# Patient Record
Sex: MALE | Race: WHITE | NOT HISPANIC OR LATINO | Employment: OTHER
[De-identification: names, ages, dates, MRNs, and addresses within clinical notes are randomized per-mention and may not be internally consistent; named-entity substitution may affect disease eponyms.]

---

## 2019-01-08 ENCOUNTER — RECORDS - HEALTHEAST (OUTPATIENT)
Dept: ADMINISTRATIVE | Facility: OTHER | Age: 64
End: 2019-01-08

## 2021-05-28 ENCOUNTER — RECORDS - HEALTHEAST (OUTPATIENT)
Dept: ADMINISTRATIVE | Facility: CLINIC | Age: 66
End: 2021-05-28

## 2021-06-16 PROBLEM — I26.99 PULMONARY EMBOLISM ON LEFT (H): Status: ACTIVE | Noted: 2019-01-08

## 2021-06-16 PROBLEM — I26.99 ACUTE PULMONARY EMBOLISM (H): Status: ACTIVE | Noted: 2019-01-08

## 2021-06-26 ENCOUNTER — HEALTH MAINTENANCE LETTER (OUTPATIENT)
Age: 66
End: 2021-06-26

## 2021-10-16 ENCOUNTER — HEALTH MAINTENANCE LETTER (OUTPATIENT)
Age: 66
End: 2021-10-16

## 2022-07-17 ENCOUNTER — HEALTH MAINTENANCE LETTER (OUTPATIENT)
Age: 67
End: 2022-07-17

## 2022-09-25 ENCOUNTER — HEALTH MAINTENANCE LETTER (OUTPATIENT)
Age: 67
End: 2022-09-25

## 2023-08-06 ENCOUNTER — HEALTH MAINTENANCE LETTER (OUTPATIENT)
Age: 68
End: 2023-08-06

## 2023-09-24 ENCOUNTER — HOSPITAL ENCOUNTER (INPATIENT)
Facility: HOSPITAL | Age: 68
LOS: 3 days | Discharge: HOME | DRG: 418 | End: 2023-09-27
Attending: EMERGENCY MEDICINE | Admitting: INTERNAL MEDICINE
Payer: COMMERCIAL

## 2023-09-24 ENCOUNTER — APPOINTMENT (OUTPATIENT)
Dept: ULTRASOUND IMAGING | Facility: HOSPITAL | Age: 68
DRG: 418 | End: 2023-09-24
Attending: EMERGENCY MEDICINE
Payer: COMMERCIAL

## 2023-09-24 ENCOUNTER — APPOINTMENT (OUTPATIENT)
Dept: RADIOLOGY | Facility: HOSPITAL | Age: 68
DRG: 418 | End: 2023-09-24
Attending: EMERGENCY MEDICINE
Payer: COMMERCIAL

## 2023-09-24 DIAGNOSIS — I26.99 PULMONARY EMBOLISM ON LEFT (H): ICD-10-CM

## 2023-09-24 DIAGNOSIS — K80.50 CHOLEDOCHOLITHIASIS: Primary | ICD-10-CM

## 2023-09-24 DIAGNOSIS — I10 BENIGN ESSENTIAL HYPERTENSION: ICD-10-CM

## 2023-09-24 DIAGNOSIS — K81.0 ACUTE CHOLECYSTITIS: ICD-10-CM

## 2023-09-24 LAB
ALBUMIN SERPL BCG-MCNC: 4.1 G/DL (ref 3.5–5.2)
ALP SERPL-CCNC: 176 U/L (ref 40–129)
ALT SERPL W P-5'-P-CCNC: 37 U/L (ref 0–70)
ANION GAP SERPL CALCULATED.3IONS-SCNC: 14 MMOL/L (ref 7–15)
AST SERPL W P-5'-P-CCNC: 47 U/L (ref 0–45)
BASOPHILS # BLD AUTO: 0 10E3/UL (ref 0–0.2)
BASOPHILS NFR BLD AUTO: 0 %
BILIRUB DIRECT SERPL-MCNC: 0.56 MG/DL (ref 0–0.3)
BILIRUB SERPL-MCNC: 1 MG/DL
BUN SERPL-MCNC: 18.8 MG/DL (ref 8–23)
CALCIUM SERPL-MCNC: 9.2 MG/DL (ref 8.8–10.2)
CHLORIDE SERPL-SCNC: 98 MMOL/L (ref 98–107)
CREAT SERPL-MCNC: 0.92 MG/DL (ref 0.67–1.17)
DEPRECATED HCO3 PLAS-SCNC: 24 MMOL/L (ref 22–29)
EGFRCR SERPLBLD CKD-EPI 2021: >90 ML/MIN/1.73M2
EOSINOPHIL # BLD AUTO: 0.3 10E3/UL (ref 0–0.7)
EOSINOPHIL NFR BLD AUTO: 2 %
ERYTHROCYTE [DISTWIDTH] IN BLOOD BY AUTOMATED COUNT: 13.8 % (ref 10–15)
GLUCOSE SERPL-MCNC: 129 MG/DL (ref 70–99)
HCT VFR BLD AUTO: 44.4 % (ref 40–53)
HGB BLD-MCNC: 14.8 G/DL (ref 13.3–17.7)
HOLD SPECIMEN: NORMAL
IMM GRANULOCYTES # BLD: 0.1 10E3/UL
IMM GRANULOCYTES NFR BLD: 0 %
LACTATE SERPL-SCNC: 0.8 MMOL/L (ref 0.7–2)
LIPASE SERPL-CCNC: 43 U/L (ref 13–60)
LYMPHOCYTES # BLD AUTO: 2 10E3/UL (ref 0.8–5.3)
LYMPHOCYTES NFR BLD AUTO: 13 %
MAGNESIUM SERPL-MCNC: 1.6 MG/DL (ref 1.7–2.3)
MCH RBC QN AUTO: 28 PG (ref 26.5–33)
MCHC RBC AUTO-ENTMCNC: 33.3 G/DL (ref 31.5–36.5)
MCV RBC AUTO: 84 FL (ref 78–100)
MONOCYTES # BLD AUTO: 1.9 10E3/UL (ref 0–1.3)
MONOCYTES NFR BLD AUTO: 12 %
NEUTROPHILS # BLD AUTO: 11.5 10E3/UL (ref 1.6–8.3)
NEUTROPHILS NFR BLD AUTO: 73 %
NRBC # BLD AUTO: 0 10E3/UL
NRBC BLD AUTO-RTO: 0 /100
PLATELET # BLD AUTO: 270 10E3/UL (ref 150–450)
POTASSIUM SERPL-SCNC: 3.3 MMOL/L (ref 3.4–5.3)
PROT SERPL-MCNC: 7.3 G/DL (ref 6.4–8.3)
RBC # BLD AUTO: 5.28 10E6/UL (ref 4.4–5.9)
SODIUM SERPL-SCNC: 136 MMOL/L (ref 136–145)
TROPONIN T SERPL HS-MCNC: 12 NG/L
WBC # BLD AUTO: 15.8 10E3/UL (ref 4–11)

## 2023-09-24 PROCEDURE — 82248 BILIRUBIN DIRECT: CPT | Performed by: EMERGENCY MEDICINE

## 2023-09-24 PROCEDURE — 83605 ASSAY OF LACTIC ACID: CPT | Performed by: EMERGENCY MEDICINE

## 2023-09-24 PROCEDURE — 258N000003 HC RX IP 258 OP 636: Performed by: EMERGENCY MEDICINE

## 2023-09-24 PROCEDURE — 85025 COMPLETE CBC W/AUTO DIFF WBC: CPT | Performed by: EMERGENCY MEDICINE

## 2023-09-24 PROCEDURE — 93005 ELECTROCARDIOGRAM TRACING: CPT | Performed by: EMERGENCY MEDICINE

## 2023-09-24 PROCEDURE — 120N000001 HC R&B MED SURG/OB

## 2023-09-24 PROCEDURE — 99285 EMERGENCY DEPT VISIT HI MDM: CPT | Mod: 25

## 2023-09-24 PROCEDURE — 84484 ASSAY OF TROPONIN QUANT: CPT | Performed by: EMERGENCY MEDICINE

## 2023-09-24 PROCEDURE — 96374 THER/PROPH/DIAG INJ IV PUSH: CPT | Mod: 59

## 2023-09-24 PROCEDURE — 83690 ASSAY OF LIPASE: CPT | Performed by: EMERGENCY MEDICINE

## 2023-09-24 PROCEDURE — 36415 COLL VENOUS BLD VENIPUNCTURE: CPT | Performed by: EMERGENCY MEDICINE

## 2023-09-24 PROCEDURE — 99223 1ST HOSP IP/OBS HIGH 75: CPT | Mod: AI | Performed by: INTERNAL MEDICINE

## 2023-09-24 PROCEDURE — 99418 PROLNG IP/OBS E/M EA 15 MIN: CPT | Performed by: INTERNAL MEDICINE

## 2023-09-24 PROCEDURE — 83735 ASSAY OF MAGNESIUM: CPT | Performed by: EMERGENCY MEDICINE

## 2023-09-24 PROCEDURE — 76705 ECHO EXAM OF ABDOMEN: CPT

## 2023-09-24 PROCEDURE — 71045 X-RAY EXAM CHEST 1 VIEW: CPT

## 2023-09-24 PROCEDURE — 96361 HYDRATE IV INFUSION ADD-ON: CPT

## 2023-09-24 PROCEDURE — 250N000011 HC RX IP 250 OP 636: Performed by: EMERGENCY MEDICINE

## 2023-09-24 RX ORDER — PIPERACILLIN SODIUM, TAZOBACTAM SODIUM 3; .375 G/15ML; G/15ML
3.38 INJECTION, POWDER, LYOPHILIZED, FOR SOLUTION INTRAVENOUS EVERY 6 HOURS
Status: DISCONTINUED | OUTPATIENT
Start: 2023-09-24 | End: 2023-09-24

## 2023-09-24 RX ORDER — RIVAROXABAN 20 MG/1
20 TABLET, FILM COATED ORAL EVERY MORNING
Status: ON HOLD | COMMUNITY
Start: 2023-07-17 | End: 2023-09-27

## 2023-09-24 RX ORDER — FLUTICASONE PROPIONATE 100 UG/1
1 POWDER, METERED RESPIRATORY (INHALATION) 2 TIMES DAILY
COMMUNITY
Start: 2023-09-06

## 2023-09-24 RX ORDER — ONDANSETRON 2 MG/ML
4 INJECTION INTRAMUSCULAR; INTRAVENOUS EVERY 6 HOURS PRN
Status: DISCONTINUED | OUTPATIENT
Start: 2023-09-24 | End: 2023-09-26

## 2023-09-24 RX ORDER — ALBUTEROL SULFATE 90 UG/1
2 AEROSOL, METERED RESPIRATORY (INHALATION) EVERY 6 HOURS PRN
COMMUNITY
Start: 2023-09-06

## 2023-09-24 RX ORDER — ONDANSETRON 4 MG/1
4 TABLET, ORALLY DISINTEGRATING ORAL EVERY 6 HOURS PRN
Status: DISCONTINUED | OUTPATIENT
Start: 2023-09-24 | End: 2023-09-26

## 2023-09-24 RX ORDER — PIPERACILLIN SODIUM, TAZOBACTAM SODIUM 3; .375 G/15ML; G/15ML
3.38 INJECTION, POWDER, LYOPHILIZED, FOR SOLUTION INTRAVENOUS EVERY 6 HOURS
Status: DISCONTINUED | OUTPATIENT
Start: 2023-09-25 | End: 2023-09-24

## 2023-09-24 RX ORDER — ALBUTEROL SULFATE 90 UG/1
2 AEROSOL, METERED RESPIRATORY (INHALATION) EVERY 6 HOURS PRN
Status: DISCONTINUED | OUTPATIENT
Start: 2023-09-24 | End: 2023-09-27 | Stop reason: HOSPADM

## 2023-09-24 RX ORDER — MORPHINE SULFATE 4 MG/ML
4 INJECTION, SOLUTION INTRAMUSCULAR; INTRAVENOUS ONCE
Status: COMPLETED | OUTPATIENT
Start: 2023-09-24 | End: 2023-09-24

## 2023-09-24 RX ORDER — PIPERACILLIN SODIUM, TAZOBACTAM SODIUM 3; .375 G/15ML; G/15ML
3.38 INJECTION, POWDER, LYOPHILIZED, FOR SOLUTION INTRAVENOUS EVERY 8 HOURS
Status: DISCONTINUED | OUTPATIENT
Start: 2023-09-25 | End: 2023-09-27

## 2023-09-24 RX ORDER — PIPERACILLIN SODIUM, TAZOBACTAM SODIUM 3; .375 G/15ML; G/15ML
3.38 INJECTION, POWDER, LYOPHILIZED, FOR SOLUTION INTRAVENOUS ONCE
Status: COMPLETED | OUTPATIENT
Start: 2023-09-24 | End: 2023-09-25

## 2023-09-24 RX ADMIN — SODIUM CHLORIDE 1000 ML: 9 INJECTION, SOLUTION INTRAVENOUS at 20:18

## 2023-09-24 RX ADMIN — PIPERACILLIN AND TAZOBACTAM 3.38 G: 3; .375 INJECTION, POWDER, LYOPHILIZED, FOR SOLUTION INTRAVENOUS at 23:42

## 2023-09-24 RX ADMIN — MORPHINE SULFATE 4 MG: 4 INJECTION, SOLUTION INTRAMUSCULAR; INTRAVENOUS at 21:12

## 2023-09-24 ASSESSMENT — ACTIVITIES OF DAILY LIVING (ADL)
ADLS_ACUITY_SCORE: 35
ADLS_ACUITY_SCORE: 35

## 2023-09-25 ENCOUNTER — APPOINTMENT (OUTPATIENT)
Dept: RADIOLOGY | Facility: HOSPITAL | Age: 68
DRG: 418 | End: 2023-09-25
Attending: SURGERY
Payer: COMMERCIAL

## 2023-09-25 ENCOUNTER — ANESTHESIA EVENT (OUTPATIENT)
Dept: SURGERY | Facility: HOSPITAL | Age: 68
DRG: 418 | End: 2023-09-25
Payer: COMMERCIAL

## 2023-09-25 ENCOUNTER — ANESTHESIA (OUTPATIENT)
Dept: SURGERY | Facility: HOSPITAL | Age: 68
DRG: 418 | End: 2023-09-25
Payer: COMMERCIAL

## 2023-09-25 PROBLEM — K80.50 CHOLEDOCHOLITHIASIS: Status: ACTIVE | Noted: 2023-09-25

## 2023-09-25 LAB
ALBUMIN SERPL BCG-MCNC: 3.9 G/DL (ref 3.5–5.2)
ALP SERPL-CCNC: 316 U/L (ref 40–129)
ALT SERPL W P-5'-P-CCNC: 146 U/L (ref 0–70)
ANION GAP SERPL CALCULATED.3IONS-SCNC: 12 MMOL/L (ref 7–15)
AST SERPL W P-5'-P-CCNC: 182 U/L (ref 0–45)
BASOPHILS # BLD AUTO: 0 10E3/UL (ref 0–0.2)
BASOPHILS NFR BLD AUTO: 0 %
BILIRUB SERPL-MCNC: 2.7 MG/DL
BUN SERPL-MCNC: 18.3 MG/DL (ref 8–23)
CALCIUM SERPL-MCNC: 8.9 MG/DL (ref 8.8–10.2)
CHLORIDE SERPL-SCNC: 99 MMOL/L (ref 98–107)
CREAT SERPL-MCNC: 0.95 MG/DL (ref 0.67–1.17)
DEPRECATED HCO3 PLAS-SCNC: 25 MMOL/L (ref 22–29)
EGFRCR SERPLBLD CKD-EPI 2021: 87 ML/MIN/1.73M2
EOSINOPHIL # BLD AUTO: 0 10E3/UL (ref 0–0.7)
EOSINOPHIL NFR BLD AUTO: 0 %
ERYTHROCYTE [DISTWIDTH] IN BLOOD BY AUTOMATED COUNT: 13.8 % (ref 10–15)
GLUCOSE BLDC GLUCOMTR-MCNC: 116 MG/DL (ref 70–99)
GLUCOSE SERPL-MCNC: 120 MG/DL (ref 70–99)
HCT VFR BLD AUTO: 41.9 % (ref 40–53)
HGB BLD-MCNC: 14 G/DL (ref 13.3–17.7)
IMM GRANULOCYTES # BLD: 0 10E3/UL
IMM GRANULOCYTES NFR BLD: 0 %
LYMPHOCYTES # BLD AUTO: 0.7 10E3/UL (ref 0.8–5.3)
LYMPHOCYTES NFR BLD AUTO: 5 %
MAGNESIUM SERPL-MCNC: 1.7 MG/DL (ref 1.7–2.3)
MCH RBC QN AUTO: 28.3 PG (ref 26.5–33)
MCHC RBC AUTO-ENTMCNC: 33.4 G/DL (ref 31.5–36.5)
MCV RBC AUTO: 85 FL (ref 78–100)
MONOCYTES # BLD AUTO: 1.4 10E3/UL (ref 0–1.3)
MONOCYTES NFR BLD AUTO: 10 %
NEUTROPHILS # BLD AUTO: 11.9 10E3/UL (ref 1.6–8.3)
NEUTROPHILS NFR BLD AUTO: 85 %
NRBC # BLD AUTO: 0 10E3/UL
NRBC BLD AUTO-RTO: 0 /100
PLATELET # BLD AUTO: 235 10E3/UL (ref 150–450)
POTASSIUM SERPL-SCNC: 3.4 MMOL/L (ref 3.4–5.3)
PROT SERPL-MCNC: 7 G/DL (ref 6.4–8.3)
RBC # BLD AUTO: 4.94 10E6/UL (ref 4.4–5.9)
SARS-COV-2 RNA RESP QL NAA+PROBE: NEGATIVE
SODIUM SERPL-SCNC: 136 MMOL/L (ref 136–145)
TROPONIN T SERPL HS-MCNC: 14 NG/L
WBC # BLD AUTO: 14.1 10E3/UL (ref 4–11)

## 2023-09-25 PROCEDURE — 250N000011 HC RX IP 250 OP 636: Mod: JZ | Performed by: NURSE ANESTHETIST, CERTIFIED REGISTERED

## 2023-09-25 PROCEDURE — 272N000001 HC OR GENERAL SUPPLY STERILE: Performed by: SURGERY

## 2023-09-25 PROCEDURE — 0FT44ZZ RESECTION OF GALLBLADDER, PERCUTANEOUS ENDOSCOPIC APPROACH: ICD-10-PCS | Performed by: SURGERY

## 2023-09-25 PROCEDURE — 250N000011 HC RX IP 250 OP 636: Performed by: ANESTHESIOLOGY

## 2023-09-25 PROCEDURE — 83735 ASSAY OF MAGNESIUM: CPT | Performed by: INTERNAL MEDICINE

## 2023-09-25 PROCEDURE — 250N000009 HC RX 250: Performed by: NURSE ANESTHETIST, CERTIFIED REGISTERED

## 2023-09-25 PROCEDURE — 999N000141 HC STATISTIC PRE-PROCEDURE NURSING ASSESSMENT: Performed by: SURGERY

## 2023-09-25 PROCEDURE — 250N000013 HC RX MED GY IP 250 OP 250 PS 637: Performed by: SURGERY

## 2023-09-25 PROCEDURE — 80053 COMPREHEN METABOLIC PANEL: CPT | Performed by: INTERNAL MEDICINE

## 2023-09-25 PROCEDURE — 250N000011 HC RX IP 250 OP 636: Mod: JZ | Performed by: INTERNAL MEDICINE

## 2023-09-25 PROCEDURE — 84484 ASSAY OF TROPONIN QUANT: CPT | Performed by: INTERNAL MEDICINE

## 2023-09-25 PROCEDURE — 36415 COLL VENOUS BLD VENIPUNCTURE: CPT | Performed by: INTERNAL MEDICINE

## 2023-09-25 PROCEDURE — 250N000011 HC RX IP 250 OP 636: Performed by: SURGERY

## 2023-09-25 PROCEDURE — 710N000009 HC RECOVERY PHASE 1, LEVEL 1, PER MIN: Performed by: SURGERY

## 2023-09-25 PROCEDURE — 88304 TISSUE EXAM BY PATHOLOGIST: CPT | Mod: TC | Performed by: SURGERY

## 2023-09-25 PROCEDURE — 87635 SARS-COV-2 COVID-19 AMP PRB: CPT | Performed by: INTERNAL MEDICINE

## 2023-09-25 PROCEDURE — 99222 1ST HOSP IP/OBS MODERATE 55: CPT | Mod: 57 | Performed by: SURGERY

## 2023-09-25 PROCEDURE — 250N000011 HC RX IP 250 OP 636: Performed by: INTERNAL MEDICINE

## 2023-09-25 PROCEDURE — BF131ZZ FLUOROSCOPY OF GALLBLADDER AND BILE DUCTS USING LOW OSMOLAR CONTRAST: ICD-10-PCS | Performed by: SURGERY

## 2023-09-25 PROCEDURE — 258N000003 HC RX IP 258 OP 636: Performed by: NURSE ANESTHETIST, CERTIFIED REGISTERED

## 2023-09-25 PROCEDURE — 370N000017 HC ANESTHESIA TECHNICAL FEE, PER MIN: Performed by: SURGERY

## 2023-09-25 PROCEDURE — 999N000182 XR SURGERY CARM FLUORO GREATER THAN 5 MIN

## 2023-09-25 PROCEDURE — 85025 COMPLETE CBC W/AUTO DIFF WBC: CPT | Performed by: INTERNAL MEDICINE

## 2023-09-25 PROCEDURE — 120N000001 HC R&B MED SURG/OB

## 2023-09-25 PROCEDURE — 250N000009 HC RX 250: Performed by: SURGERY

## 2023-09-25 PROCEDURE — 258N000003 HC RX IP 258 OP 636: Performed by: ANESTHESIOLOGY

## 2023-09-25 PROCEDURE — 47563 LAPARO CHOLECYSTECTOMY/GRAPH: CPT | Performed by: SURGERY

## 2023-09-25 PROCEDURE — 360N000076 HC SURGERY LEVEL 3, PER MIN: Performed by: SURGERY

## 2023-09-25 PROCEDURE — 250N000011 HC RX IP 250 OP 636: Mod: JZ | Performed by: ANESTHESIOLOGY

## 2023-09-25 PROCEDURE — 250N000013 HC RX MED GY IP 250 OP 250 PS 637: Performed by: INTERNAL MEDICINE

## 2023-09-25 PROCEDURE — 258N000003 HC RX IP 258 OP 636: Performed by: SURGERY

## 2023-09-25 PROCEDURE — 258N000003 HC RX IP 258 OP 636: Performed by: INTERNAL MEDICINE

## 2023-09-25 PROCEDURE — 99232 SBSQ HOSP IP/OBS MODERATE 35: CPT | Performed by: INTERNAL MEDICINE

## 2023-09-25 PROCEDURE — 255N000002 HC RX 255 OP 636: Performed by: SURGERY

## 2023-09-25 PROCEDURE — 250N000025 HC SEVOFLURANE, PER MIN: Performed by: SURGERY

## 2023-09-25 RX ORDER — OXYCODONE HYDROCHLORIDE 5 MG/1
5 TABLET ORAL EVERY 4 HOURS PRN
Status: DISCONTINUED | OUTPATIENT
Start: 2023-09-25 | End: 2023-09-27 | Stop reason: HOSPADM

## 2023-09-25 RX ORDER — DEXTROSE MONOHYDRATE, SODIUM CHLORIDE, AND POTASSIUM CHLORIDE 50; 1.49; 4.5 G/1000ML; G/1000ML; G/1000ML
INJECTION, SOLUTION INTRAVENOUS CONTINUOUS
Status: DISCONTINUED | OUTPATIENT
Start: 2023-09-25 | End: 2023-09-27

## 2023-09-25 RX ORDER — GLYCOPYRROLATE 0.2 MG/ML
INJECTION, SOLUTION INTRAMUSCULAR; INTRAVENOUS PRN
Status: DISCONTINUED | OUTPATIENT
Start: 2023-09-25 | End: 2023-09-25

## 2023-09-25 RX ORDER — ACETAMINOPHEN 325 MG/1
975 TABLET ORAL ONCE
Status: COMPLETED | OUTPATIENT
Start: 2023-09-25 | End: 2023-09-25

## 2023-09-25 RX ORDER — HYDROMORPHONE HYDROCHLORIDE 1 MG/ML
0.5 INJECTION, SOLUTION INTRAMUSCULAR; INTRAVENOUS; SUBCUTANEOUS EVERY 4 HOURS PRN
Status: DISCONTINUED | OUTPATIENT
Start: 2023-09-25 | End: 2023-09-26

## 2023-09-25 RX ORDER — HYDROXYZINE HYDROCHLORIDE 10 MG/1
10 TABLET, FILM COATED ORAL EVERY 6 HOURS PRN
Status: DISCONTINUED | OUTPATIENT
Start: 2023-09-25 | End: 2023-09-27 | Stop reason: HOSPADM

## 2023-09-25 RX ORDER — SODIUM CHLORIDE 9 MG/ML
INJECTION, SOLUTION INTRAVENOUS CONTINUOUS
Status: DISCONTINUED | OUTPATIENT
Start: 2023-09-25 | End: 2023-09-26

## 2023-09-25 RX ORDER — LIDOCAINE 40 MG/G
CREAM TOPICAL
Status: DISCONTINUED | OUTPATIENT
Start: 2023-09-25 | End: 2023-09-27 | Stop reason: HOSPADM

## 2023-09-25 RX ORDER — FENTANYL CITRATE 50 UG/ML
50 INJECTION, SOLUTION INTRAMUSCULAR; INTRAVENOUS EVERY 5 MIN PRN
Status: DISCONTINUED | OUTPATIENT
Start: 2023-09-25 | End: 2023-09-25 | Stop reason: HOSPADM

## 2023-09-25 RX ORDER — ONDANSETRON 2 MG/ML
4 INJECTION INTRAMUSCULAR; INTRAVENOUS EVERY 6 HOURS PRN
Status: DISCONTINUED | OUTPATIENT
Start: 2023-09-25 | End: 2023-09-27 | Stop reason: HOSPADM

## 2023-09-25 RX ORDER — ACETAMINOPHEN 325 MG/1
975 TABLET ORAL ONCE
Status: DISCONTINUED | OUTPATIENT
Start: 2023-09-25 | End: 2023-09-25

## 2023-09-25 RX ORDER — MAGNESIUM HYDROXIDE 1200 MG/15ML
LIQUID ORAL PRN
Status: DISCONTINUED | OUTPATIENT
Start: 2023-09-25 | End: 2023-09-25 | Stop reason: HOSPADM

## 2023-09-25 RX ORDER — FAMOTIDINE 20 MG/1
20 TABLET, FILM COATED ORAL 2 TIMES DAILY
Status: DISCONTINUED | OUTPATIENT
Start: 2023-09-25 | End: 2023-09-27 | Stop reason: HOSPADM

## 2023-09-25 RX ORDER — ACETAMINOPHEN 325 MG/1
975 TABLET ORAL EVERY 8 HOURS
Status: DISCONTINUED | OUTPATIENT
Start: 2023-09-25 | End: 2023-09-27 | Stop reason: HOSPADM

## 2023-09-25 RX ORDER — DEXTROSE, SODIUM CHLORIDE, SODIUM LACTATE, POTASSIUM CHLORIDE, AND CALCIUM CHLORIDE 5; .6; .31; .03; .02 G/100ML; G/100ML; G/100ML; G/100ML; G/100ML
INJECTION, SOLUTION INTRAVENOUS CONTINUOUS
Status: DISCONTINUED | OUTPATIENT
Start: 2023-09-25 | End: 2023-09-25

## 2023-09-25 RX ORDER — SODIUM CHLORIDE, SODIUM LACTATE, POTASSIUM CHLORIDE, CALCIUM CHLORIDE 600; 310; 30; 20 MG/100ML; MG/100ML; MG/100ML; MG/100ML
INJECTION, SOLUTION INTRAVENOUS CONTINUOUS
Status: DISCONTINUED | OUTPATIENT
Start: 2023-09-25 | End: 2023-09-25 | Stop reason: HOSPADM

## 2023-09-25 RX ORDER — BUPIVACAINE HYDROCHLORIDE 2.5 MG/ML
INJECTION, SOLUTION INFILTRATION; PERINEURAL PRN
Status: DISCONTINUED | OUTPATIENT
Start: 2023-09-25 | End: 2023-09-25 | Stop reason: HOSPADM

## 2023-09-25 RX ORDER — PIPERACILLIN SODIUM, TAZOBACTAM SODIUM 3; .375 G/15ML; G/15ML
3.38 INJECTION, POWDER, LYOPHILIZED, FOR SOLUTION INTRAVENOUS ONCE
Status: DISCONTINUED | OUTPATIENT
Start: 2023-09-25 | End: 2023-09-25

## 2023-09-25 RX ORDER — AMOXICILLIN 250 MG
1 CAPSULE ORAL 2 TIMES DAILY
Status: DISCONTINUED | OUTPATIENT
Start: 2023-09-25 | End: 2023-09-27 | Stop reason: HOSPADM

## 2023-09-25 RX ORDER — PROPOFOL 10 MG/ML
INJECTION, EMULSION INTRAVENOUS PRN
Status: DISCONTINUED | OUTPATIENT
Start: 2023-09-25 | End: 2023-09-25

## 2023-09-25 RX ORDER — CEFAZOLIN SODIUM/WATER 2 G/20 ML
2 SYRINGE (ML) INTRAVENOUS
Status: COMPLETED | OUTPATIENT
Start: 2023-09-25 | End: 2023-09-25

## 2023-09-25 RX ORDER — HYDROMORPHONE HYDROCHLORIDE 1 MG/ML
0.2 INJECTION, SOLUTION INTRAMUSCULAR; INTRAVENOUS; SUBCUTANEOUS EVERY 5 MIN PRN
Status: DISCONTINUED | OUTPATIENT
Start: 2023-09-25 | End: 2023-09-25 | Stop reason: HOSPADM

## 2023-09-25 RX ORDER — NALOXONE HYDROCHLORIDE 0.4 MG/ML
0.4 INJECTION, SOLUTION INTRAMUSCULAR; INTRAVENOUS; SUBCUTANEOUS
Status: DISCONTINUED | OUTPATIENT
Start: 2023-09-25 | End: 2023-09-27 | Stop reason: HOSPADM

## 2023-09-25 RX ORDER — KETAMINE HYDROCHLORIDE 10 MG/ML
INJECTION INTRAMUSCULAR; INTRAVENOUS PRN
Status: DISCONTINUED | OUTPATIENT
Start: 2023-09-25 | End: 2023-09-25

## 2023-09-25 RX ORDER — NALOXONE HYDROCHLORIDE 0.4 MG/ML
0.2 INJECTION, SOLUTION INTRAMUSCULAR; INTRAVENOUS; SUBCUTANEOUS
Status: DISCONTINUED | OUTPATIENT
Start: 2023-09-25 | End: 2023-09-27 | Stop reason: HOSPADM

## 2023-09-25 RX ORDER — MAGNESIUM SULFATE 4 G/50ML
4 INJECTION INTRAVENOUS ONCE
Status: COMPLETED | OUTPATIENT
Start: 2023-09-25 | End: 2023-09-25

## 2023-09-25 RX ORDER — FENTANYL CITRATE 50 UG/ML
25 INJECTION, SOLUTION INTRAMUSCULAR; INTRAVENOUS EVERY 5 MIN PRN
Status: DISCONTINUED | OUTPATIENT
Start: 2023-09-25 | End: 2023-09-25 | Stop reason: HOSPADM

## 2023-09-25 RX ORDER — FENTANYL CITRATE 50 UG/ML
INJECTION, SOLUTION INTRAMUSCULAR; INTRAVENOUS PRN
Status: DISCONTINUED | OUTPATIENT
Start: 2023-09-25 | End: 2023-09-25

## 2023-09-25 RX ORDER — ACETAMINOPHEN 325 MG/1
650 TABLET ORAL EVERY 4 HOURS PRN
Status: DISCONTINUED | OUTPATIENT
Start: 2023-09-28 | End: 2023-09-27 | Stop reason: HOSPADM

## 2023-09-25 RX ORDER — PROCHLORPERAZINE MALEATE 5 MG
5 TABLET ORAL EVERY 6 HOURS PRN
Status: DISCONTINUED | OUTPATIENT
Start: 2023-09-25 | End: 2023-09-27 | Stop reason: HOSPADM

## 2023-09-25 RX ORDER — ONDANSETRON 2 MG/ML
INJECTION INTRAMUSCULAR; INTRAVENOUS PRN
Status: DISCONTINUED | OUTPATIENT
Start: 2023-09-25 | End: 2023-09-25

## 2023-09-25 RX ORDER — DEXAMETHASONE SODIUM PHOSPHATE 10 MG/ML
INJECTION, SOLUTION INTRAMUSCULAR; INTRAVENOUS PRN
Status: DISCONTINUED | OUTPATIENT
Start: 2023-09-25 | End: 2023-09-25

## 2023-09-25 RX ORDER — ONDANSETRON 2 MG/ML
4 INJECTION INTRAMUSCULAR; INTRAVENOUS EVERY 30 MIN PRN
Status: DISCONTINUED | OUTPATIENT
Start: 2023-09-25 | End: 2023-09-25 | Stop reason: HOSPADM

## 2023-09-25 RX ORDER — ONDANSETRON 2 MG/ML
4 INJECTION INTRAMUSCULAR; INTRAVENOUS EVERY 6 HOURS PRN
Status: DISCONTINUED | OUTPATIENT
Start: 2023-09-25 | End: 2023-09-26

## 2023-09-25 RX ORDER — OXYCODONE HYDROCHLORIDE 5 MG/1
10 TABLET ORAL EVERY 4 HOURS PRN
Status: DISCONTINUED | OUTPATIENT
Start: 2023-09-25 | End: 2023-09-27 | Stop reason: HOSPADM

## 2023-09-25 RX ORDER — ONDANSETRON 4 MG/1
4 TABLET, ORALLY DISINTEGRATING ORAL EVERY 6 HOURS PRN
Status: DISCONTINUED | OUTPATIENT
Start: 2023-09-25 | End: 2023-09-27 | Stop reason: HOSPADM

## 2023-09-25 RX ORDER — POLYETHYLENE GLYCOL 3350 17 G/17G
17 POWDER, FOR SOLUTION ORAL DAILY
Status: DISCONTINUED | OUTPATIENT
Start: 2023-09-26 | End: 2023-09-27 | Stop reason: HOSPADM

## 2023-09-25 RX ORDER — ONDANSETRON 4 MG/1
4 TABLET, ORALLY DISINTEGRATING ORAL EVERY 30 MIN PRN
Status: DISCONTINUED | OUTPATIENT
Start: 2023-09-25 | End: 2023-09-25 | Stop reason: HOSPADM

## 2023-09-25 RX ORDER — ONDANSETRON 4 MG/1
4 TABLET, ORALLY DISINTEGRATING ORAL EVERY 6 HOURS PRN
Status: DISCONTINUED | OUTPATIENT
Start: 2023-09-25 | End: 2023-09-26

## 2023-09-25 RX ORDER — MEPERIDINE HYDROCHLORIDE 25 MG/ML
12.5 INJECTION INTRAMUSCULAR; INTRAVENOUS; SUBCUTANEOUS ONCE
Status: COMPLETED | OUTPATIENT
Start: 2023-09-25 | End: 2023-09-25

## 2023-09-25 RX ORDER — CEFAZOLIN SODIUM/WATER 2 G/20 ML
2 SYRINGE (ML) INTRAVENOUS SEE ADMIN INSTRUCTIONS
Status: DISCONTINUED | OUTPATIENT
Start: 2023-09-25 | End: 2023-09-25 | Stop reason: HOSPADM

## 2023-09-25 RX ORDER — SODIUM CHLORIDE, SODIUM LACTATE, POTASSIUM CHLORIDE, AND CALCIUM CHLORIDE .6; .31; .03; .02 G/100ML; G/100ML; G/100ML; G/100ML
IRRIGANT IRRIGATION PRN
Status: DISCONTINUED | OUTPATIENT
Start: 2023-09-25 | End: 2023-09-25 | Stop reason: HOSPADM

## 2023-09-25 RX ORDER — HYDROMORPHONE HYDROCHLORIDE 1 MG/ML
0.5 INJECTION, SOLUTION INTRAMUSCULAR; INTRAVENOUS; SUBCUTANEOUS
Status: DISCONTINUED | OUTPATIENT
Start: 2023-09-25 | End: 2023-09-27 | Stop reason: HOSPADM

## 2023-09-25 RX ORDER — HYDROMORPHONE HCL IN WATER/PF 6 MG/30 ML
0.2 PATIENT CONTROLLED ANALGESIA SYRINGE INTRAVENOUS
Status: DISCONTINUED | OUTPATIENT
Start: 2023-09-25 | End: 2023-09-27 | Stop reason: HOSPADM

## 2023-09-25 RX ORDER — BISACODYL 10 MG
10 SUPPOSITORY, RECTAL RECTAL DAILY PRN
Status: DISCONTINUED | OUTPATIENT
Start: 2023-09-25 | End: 2023-09-27 | Stop reason: HOSPADM

## 2023-09-25 RX ORDER — HYDROMORPHONE HYDROCHLORIDE 1 MG/ML
0.4 INJECTION, SOLUTION INTRAMUSCULAR; INTRAVENOUS; SUBCUTANEOUS EVERY 5 MIN PRN
Status: DISCONTINUED | OUTPATIENT
Start: 2023-09-25 | End: 2023-09-25 | Stop reason: HOSPADM

## 2023-09-25 RX ORDER — LIDOCAINE 40 MG/G
CREAM TOPICAL
Status: DISCONTINUED | OUTPATIENT
Start: 2023-09-25 | End: 2023-09-25 | Stop reason: HOSPADM

## 2023-09-25 RX ORDER — LIDOCAINE HYDROCHLORIDE 10 MG/ML
INJECTION, SOLUTION INFILTRATION; PERINEURAL PRN
Status: DISCONTINUED | OUTPATIENT
Start: 2023-09-25 | End: 2023-09-25

## 2023-09-25 RX ADMIN — MIDAZOLAM 1 MG: 1 INJECTION INTRAMUSCULAR; INTRAVENOUS at 15:05

## 2023-09-25 RX ADMIN — FENTANYL CITRATE 50 MCG: 50 INJECTION, SOLUTION INTRAMUSCULAR; INTRAVENOUS at 16:15

## 2023-09-25 RX ADMIN — FAMOTIDINE 20 MG: 20 TABLET ORAL at 20:09

## 2023-09-25 RX ADMIN — ROCURONIUM BROMIDE 50 MG: 50 INJECTION, SOLUTION INTRAVENOUS at 15:13

## 2023-09-25 RX ADMIN — POTASSIUM CHLORIDE, DEXTROSE MONOHYDRATE AND SODIUM CHLORIDE: 150; 5; 450 INJECTION, SOLUTION INTRAVENOUS at 20:00

## 2023-09-25 RX ADMIN — Medication 2 G: at 15:05

## 2023-09-25 RX ADMIN — GLYCOPYRROLATE 0.4 MG: 0.2 INJECTION INTRAMUSCULAR; INTRAVENOUS at 15:13

## 2023-09-25 RX ADMIN — FENTANYL CITRATE 50 MCG: 50 INJECTION, SOLUTION INTRAMUSCULAR; INTRAVENOUS at 16:59

## 2023-09-25 RX ADMIN — PHENYLEPHRINE HYDROCHLORIDE 200 MCG: 10 INJECTION INTRAVENOUS at 15:16

## 2023-09-25 RX ADMIN — DEXAMETHASONE SODIUM PHOSPHATE 10 MG: 10 INJECTION, SOLUTION INTRAMUSCULAR; INTRAVENOUS at 15:16

## 2023-09-25 RX ADMIN — SODIUM CHLORIDE, POTASSIUM CHLORIDE, SODIUM LACTATE AND CALCIUM CHLORIDE: 600; 310; 30; 20 INJECTION, SOLUTION INTRAVENOUS at 13:38

## 2023-09-25 RX ADMIN — OXYCODONE HYDROCHLORIDE 5 MG: 5 TABLET ORAL at 23:04

## 2023-09-25 RX ADMIN — HYDROMORPHONE HYDROCHLORIDE 0.5 MG: 1 INJECTION, SOLUTION INTRAMUSCULAR; INTRAVENOUS; SUBCUTANEOUS at 02:21

## 2023-09-25 RX ADMIN — HYDROMORPHONE HYDROCHLORIDE 0.2 MG: 1 INJECTION, SOLUTION INTRAMUSCULAR; INTRAVENOUS; SUBCUTANEOUS at 17:32

## 2023-09-25 RX ADMIN — METHYLCELLULOSE 1000 MG: 500 TABLET ORAL at 09:01

## 2023-09-25 RX ADMIN — SODIUM CHLORIDE: 9 INJECTION, SOLUTION INTRAVENOUS at 19:07

## 2023-09-25 RX ADMIN — FENTANYL CITRATE 100 MCG: 50 INJECTION, SOLUTION INTRAMUSCULAR; INTRAVENOUS at 15:13

## 2023-09-25 RX ADMIN — PHENYLEPHRINE HYDROCHLORIDE 100 MCG: 10 INJECTION INTRAVENOUS at 15:26

## 2023-09-25 RX ADMIN — SODIUM CHLORIDE: 9 INJECTION, SOLUTION INTRAVENOUS at 09:00

## 2023-09-25 RX ADMIN — FAMOTIDINE 20 MG: 10 INJECTION, SOLUTION INTRAVENOUS at 06:33

## 2023-09-25 RX ADMIN — SUGAMMADEX 200 MG: 100 INJECTION, SOLUTION INTRAVENOUS at 16:26

## 2023-09-25 RX ADMIN — PROPOFOL 160 MG: 10 INJECTION, EMULSION INTRAVENOUS at 15:13

## 2023-09-25 RX ADMIN — PHENYLEPHRINE HYDROCHLORIDE 100 MCG: 10 INJECTION INTRAVENOUS at 15:13

## 2023-09-25 RX ADMIN — MIDAZOLAM 1 MG: 1 INJECTION INTRAMUSCULAR; INTRAVENOUS at 15:07

## 2023-09-25 RX ADMIN — LIDOCAINE HYDROCHLORIDE 5 ML: 10 INJECTION, SOLUTION INFILTRATION; PERINEURAL at 15:13

## 2023-09-25 RX ADMIN — PIPERACILLIN AND TAZOBACTAM 3.38 G: 3; .375 INJECTION, POWDER, LYOPHILIZED, FOR SOLUTION INTRAVENOUS at 06:35

## 2023-09-25 RX ADMIN — PIPERACILLIN AND TAZOBACTAM 3.38 G: 3; .375 INJECTION, POWDER, LYOPHILIZED, FOR SOLUTION INTRAVENOUS at 22:00

## 2023-09-25 RX ADMIN — FENTANYL CITRATE 50 MCG: 50 INJECTION, SOLUTION INTRAMUSCULAR; INTRAVENOUS at 17:19

## 2023-09-25 RX ADMIN — SENNOSIDES AND DOCUSATE SODIUM 1 TABLET: 50; 8.6 TABLET ORAL at 20:09

## 2023-09-25 RX ADMIN — POTASSIUM CHLORIDE, DEXTROSE MONOHYDRATE AND SODIUM CHLORIDE: 150; 5; 450 INJECTION, SOLUTION INTRAVENOUS at 19:50

## 2023-09-25 RX ADMIN — ROCURONIUM BROMIDE 10 MG: 50 INJECTION, SOLUTION INTRAVENOUS at 15:45

## 2023-09-25 RX ADMIN — PHENYLEPHRINE HYDROCHLORIDE 100 MCG: 10 INJECTION INTRAVENOUS at 15:21

## 2023-09-25 RX ADMIN — MAGNESIUM SULFATE HEPTAHYDRATE 4 G: 80 INJECTION, SOLUTION INTRAVENOUS at 13:38

## 2023-09-25 RX ADMIN — ONDANSETRON 4 MG: 2 INJECTION INTRAMUSCULAR; INTRAVENOUS at 16:17

## 2023-09-25 RX ADMIN — ACETAMINOPHEN 975 MG: 325 TABLET ORAL at 13:45

## 2023-09-25 RX ADMIN — ACETAMINOPHEN 975 MG: 325 TABLET ORAL at 20:08

## 2023-09-25 RX ADMIN — MEPERIDINE HYDROCHLORIDE 12.5 MG: 25 INJECTION, SOLUTION INTRAMUSCULAR; INTRAVENOUS; SUBCUTANEOUS at 17:07

## 2023-09-25 RX ADMIN — KETAMINE HYDROCHLORIDE 50 MG: 10 INJECTION INTRAMUSCULAR; INTRAVENOUS at 15:13

## 2023-09-25 RX ADMIN — PIPERACILLIN AND TAZOBACTAM 3.38 G: 3; .375 INJECTION, POWDER, LYOPHILIZED, FOR SOLUTION INTRAVENOUS at 15:18

## 2023-09-25 RX ADMIN — FLUTICASONE FUROATE 1 PUFF: 100 POWDER RESPIRATORY (INHALATION) at 09:00

## 2023-09-25 RX ADMIN — SODIUM CHLORIDE, SODIUM LACTATE, POTASSIUM CHLORIDE, CALCIUM CHLORIDE AND DEXTROSE MONOHYDRATE 50 ML/HR: 5; 600; 310; 30; 20 INJECTION, SOLUTION INTRAVENOUS at 03:12

## 2023-09-25 ASSESSMENT — ACTIVITIES OF DAILY LIVING (ADL)
ADLS_ACUITY_SCORE: 35
ADLS_ACUITY_SCORE: 35
ADLS_ACUITY_SCORE: 22
ADLS_ACUITY_SCORE: 35
ADLS_ACUITY_SCORE: 35
ADLS_ACUITY_SCORE: 22
ADLS_ACUITY_SCORE: 35
ADLS_ACUITY_SCORE: 22
ADLS_ACUITY_SCORE: 20
ADLS_ACUITY_SCORE: 35
ADLS_ACUITY_SCORE: 22
ADLS_ACUITY_SCORE: 35

## 2023-09-25 NOTE — ED NOTES
Provider to room pt clutching chest 10/10 c/p, reports sharp aching pain, pain lasting aprox 1 min, pt reports pain dissipated. Pt denies nausea.

## 2023-09-25 NOTE — ED NOTES
Bed: JNED-24  Expected date: 9/24/23  Expected time: 7:37 PM  Means of arrival: Ambulance  Comments:  WBL  68 yo M chest pain/abd pain

## 2023-09-25 NOTE — ED TRIAGE NOTES
Patient arrives EMS due to chest pain. Pain in chest since sat. Pt reports Covid VACCINE Friday morning when he started to have ABD pain. Pt  has history of acid reflux. History of blood clots in lungs, on thinners, no sob. Patient took x 2 PRN lansoprazole.patient reports 4/10 c/p midsternal radiating to right ribs. Pt reports pain was up to 8/10 today. VSS. /88,80s,98%RA. . SR on EKG. Temp 98.7 oral. Patient also reports chills and fever yesterday. No N/V/D. No hx of seizures.     Triage Assessment       Row Name 09/24/23 1953       Triage Assessment (Adult)    Airway WDL WDL       Respiratory WDL    Respiratory WDL WDL       Skin Circulation/Temperature WDL    Skin Circulation/Temperature WDL WDL       Cardiac WDL    Cardiac WDL all       Chest Pain Assessment    Chest Pain Location midsternal;epigastric    Chest Pain Radiation other (see comments)  right ribs    Character aching    Duration since sat pain today was 8/10 thats when pt decided to come in    Associated Signs/Symptoms diaphoresis    Chest Pain Intervention cardiac biomarkers drawn;cardiac monitoring continued;12-lead ECG obtained;activity minimized       Peripheral/Neurovascular WDL    Peripheral Neurovascular WDL WDL       Cognitive/Neuro/Behavioral WDL    Cognitive/Neuro/Behavioral WDL WDL

## 2023-09-25 NOTE — OP NOTE
Operative Note    Name:  Pedro Chun  Location: [facility] Main OR  Procedure Date:  9/24/2023 - 9/25/2023  PCP:  Phoenix Bunch    Surgery Performed:  Procedure/Surgery Information   Procedure: Procedure(s):  CHOLECYSTECTOMY, LAPAROSCOPIC  CHOLANGIOGRAMS   Surgeon(s): Surgeon(s) and Role:     * Suleiman Griffith MD - Primary   Specimens: ID Type Source Tests Collected by Time Destination   1 :  Tissue Gallbladder SURGICAL PATHOLOGY EXAM Suleiman Griffith MD 9/25/2023  3:43 PM                Pre-Procedure Diagnosis:  Acute cholecystitis [K81.0]    Post-Procedure Diagnosis:    Acute cholecystitis [K81.0] with evidence of choledocholithiasis on intraoperative cholangiogram    Anesthesia:  General     Past Medical History:   Diagnosis Date    Hypertension        Patient Active Problem List    Diagnosis Date Noted    Acute cholecystitis 09/24/2023     Priority: Medium    Hydronephrosis of left kidney      Priority: Medium    Leukocytosis, unspecified type      Priority: Medium    Acute pulmonary embolism (H) 01/08/2019     Priority: Medium    Pulmonary embolism on left (H) 01/08/2019     Priority: Medium       Findings:  Very distended gallbladder with gallstones and an intraoperative cholangiogram where the contrast did not drain through to the duodenum.    Operative Report:    The patient was seen again in the Holding Room. The risks, benefits, complications, treatment options, and expected outcomes were discussed with the patient. The possibilities of reaction to medication, pulmonary aspiration, perforation of viscus, bleeding, recurrent infection, finding a normal gallbladder, the need for additional procedures, failure to diagnose a condition, the possible need to convert to an open procedure, and creating a complication requiring transfusion or operation were discussed with the patient. The patient and/or family concurred with the proposed plan, giving informed consent. The site of surgery  properly noted/marked. The patient was taken to Operating Room, identified, and the procedure verified as Laparoscopic Cholecystectomy with Intra Operative Cholangiogram.  A Time Out was held and the above information confirmed.    Prior to the induction of general anesthesia, antibiotic prophylaxis was administered. General endotracheal anesthesia was then administered and tolerated well. After the induction, the abdomen was prepped in the usual sterile fashion. The patient was positioned in the supine position with the left arm comfortably tucked, along with some reverse Trendelenburg.    Local anesthetic agent was injected into the skin near the umbilicus and an incision made. A periumbilical incision was made and a 5mm trocar was placed under direct visualization using the optiview technique, and a pneumoperitoneum was brought up to 15 mm Hg. . 2 5's and a  10 mm port was placed under direct vision.  All skin incisions were infiltrated with a local anesthetic agent before making the incision and placing the trocars.     The gallbladder was identified, the fundus grasped and retracted cephalad.. The infundibulum was grasped and retracted laterally, exposing the peritoneum overlying the triangle of Calot. This was then divided with hook cautery. The cystic duct was clearly identified and bluntly dissected circumferentially. The junctions of the gallbladder, cystic duct was clearly identified and clipped on the gall bladder side with 2 clips. The cystic artery was identified and clipped.      The Cystic duct was partially divided.  A cholangiogram catheter was brought in to the abdominal cavity through a 16-gauge Angiocath.  The catheter tip was advanced into the partially incised cystic duct and held in place with 2 clips.  The cholangiogram seal was tested with normal saline and found and found to flow but did not leak out around the cystic duct.  The C-arm was brought into place and intraoperative cholangiogram  was performed under direct visualization of fluoroscopy.  The findings of the cholangiogram;  good filling down in through the cystic duct and arborization up into the liver.  The flow went down into the common bile duct but stopped somewhere in the distal common bile duct and stopped progressing.  I eventually saw some filling out into the pancreatic duct and a small amount of contrast flow into the duodenum.  These are findings consistent with a common bile duct obstruction, likely secondary to choledocholithiasis.    After completing the cholangiogram the clips holding the catheter in place were removed and the catheter was removed from the intra-abdominal cavity.  3 clips were placed proximally on the cystic duct and the cystic duct was divided sharply with laparoscopic scissors.      The gallbladder was dissected from the liver bed in retrograde fashion with the electrocautery. The gallbladder was removed. Suction & irrigation was used to insure a clean surgical site with good hemostasis.     Pneumoperitoneum was completely reduced after viewing removal of the trocars under direct vision. The wound was thoroughly irrigated and the fascia of the 10 mm trocar site was then closed with a figure of eight suture, 0 vicryl.; the skin was then closed with 4-0 monocryl and a sterile dressing was applied.    Instrument, sponge, and needle counts were correct at closure and at the conclusion of the case.     Estimated Blood Loss:    10 cc       Drains:        Implants:  * No implants in log *    Complications:    None    Suleiman Griffith MD     Date: 9/25/2023  Time: 5:05 PM

## 2023-09-25 NOTE — ANESTHESIA POSTPROCEDURE EVALUATION
Patient: Pedro Chun    Procedure: Procedure(s):  CHOLECYSTECTOMY, LAPAROSCOPIC  CHOLANGIOGRAMS       Anesthesia Type:  General    Note:  Disposition: Admission   Postop Pain Control: Uneventful            Sign Out: Well controlled pain   PONV: No   Neuro/Psych: Uneventful            Sign Out: Acceptable/Baseline neuro status   Airway/Respiratory: Uneventful            Sign Out: Acceptable/Baseline resp. status   CV/Hemodynamics: Uneventful            Sign Out: Acceptable CV status; No obvious hypovolemia; No obvious fluid overload   Other NRE: NONE   DID A NON-ROUTINE EVENT OCCUR? No           Last vitals:  Vitals Value Taken Time   /76 09/25/23 1715   Temp     Pulse 108 09/25/23 1718   Resp 21 09/25/23 1718   SpO2 97 % 09/25/23 1718   Vitals shown include unvalidated device data.    Electronically Signed By: Michael Lopez MD  September 25, 2023  5:20 PM

## 2023-09-25 NOTE — ANESTHESIA PROCEDURE NOTES
Airway       Patient location during procedure: OR       Procedure Start/Stop Times: 9/25/2023 3:16 PM  Staff -        Anesthesiologist:  Lamin Giles MD       CRNA: Melisa Grayson APRN CRNA       Performed By: CRNA  Consent for Airway        Urgency: elective  Indications and Patient Condition       Indications for airway management: shonda-procedural       Induction type:intravenous       Mask difficulty assessment: 2 - vent by mask + OA or adjuvant +/- NMBA    Final Airway Details       Final airway type: endotracheal airway       Successful airway: ETT - single  Endotracheal Airway Details        ETT size (mm): 8.0       Cuffed: yes       Successful intubation technique: direct laryngoscopy       DL Blade Type: Rogers 2       Grade View of Cords: 1       Adjucts: stylet       Position: Right       Measured from: gums/teeth       Secured at (cm): 23       Bite block used: None    Post intubation assessment        Placement verified by: capnometry, equal breath sounds and chest rise        Number of attempts at approach: 1       Secured with: silk tape       Ease of procedure: easy       Dentition: Intact and Unchanged       Dental guard used and removed. Dental Guard Type: Proguard Red.    Medication(s) Administered   Medication Administration Time: 9/25/2023 3:16 PM

## 2023-09-25 NOTE — ED PROVIDER NOTES
EMERGENCY DEPARTMENT ENCOUNTER      NAME: Pedro Chun  AGE: 67 year old male  YOB: 1955  MRN: [id number]  EVALUATION DATE & TIME: 9/24/2023  7:46 PM    PCP: Davide Weber    ED PROVIDER: Manjula Galo M.D.      Chief Complaint   Patient presents with    Chest Pain    Fatigue     FINAL IMPRESSION:  1. Acute cholecystitis      ED COURSE & MEDICAL DECISION MAKING:    Pertinent Labs & Imaging studies reviewed. (See chart for details)  ED Course as of 09/24/23 2238   Sun Sep 24, 2023   2016 Patient presentation I do think we need to evaluate him for cholecystitis or choledocholithiasis.  We will get a troponin and EKG to evaluate for potential cardiac cause but he has tenderness in the right upper quadrant which I think fits more with a abdominal etiology.  Patient does have a history of blood clots and takes his blood thinners but this does not feel like the pain he had back then.  He is not short of breath.  He is not hypoxic.  He is not tachycardic.  I do not think we need to look further at that with his active complaints.  He declined anything for pain at this time.  I will order him some fluids.  He is comfortable with the plan.   2121 Patient is having these waves of pain.  I ordered some morphine for him.  Chest x-ray looks okay.  Initial troponin looks okay.  I am highly suspicious for gallbladder disease.   2210 Patient's ultrasound shows gallstones and some dilated ducts with some gallbladder wall thickening.  This is concerning for cholecystitis with potential choledocholithiasis as well.  I will put a call out to [facility], general surgery, and the hospitalist.  I started the patient on some Zosyn as well and we will get him admitted to the hospital.   2225 I discussed the results and plan with the patient.  He will need a surgical consult, GI consult, and admission to the hospital.  I have a call out to all 3 physicians at this time.   2229 I spoke with Dr. Griffith with  general surgery.  He thinks he can probably just take the gallbladder out tomorrow and they can do an intraoperative cholangiogram during surgery.  I think is very reasonable.  I am still waiting to hear back from GI and from the hospitalist.   2232 I spoke with Dr. Iglesias with [facility] and he agreed with Dr. Griffith's plan.   2237 I spoke with the hospitalist.  She was concerned that the patient test positive for COVID based on what the triage note says.  I do not know anything about that so I went and asked the patient.  He said he had his COVID-vaccine on Friday but did not test positive for COVID.  We we will get him admitted to [facility] observation bed.       Medical Decision Making    History:  Supplemental history from: None  External Record(s) reviewed: I reviewed the patient's office visit from 9/6/2023.  Patient has a history of asthma as well as esophageal reflux.  He is on Xarelto for history of pulmonary embolism.  He takes Cozaar for his hypertension.  He does have a history of lupus anticoagulant and will be on blood thinners indefinitely.  Work Up:  Emergent/Severe conditions considered and evaluated for: ACS, perforation, gastritis, choledocholithiasis, cholecystitis, hepatitis, pancreatitis  I independently reviewed and interpreted EKG.   In additional to work up documented, I considered the following work up: None  Medications given that require intensive monitoring for toxicity: IV morphine    External consultation:  Discussion of management with another provider: General surgery, GI, hospitalist    Complicating factors:  Care impacted by chronic illness: Pulmonary embolism, hypertension, reflux, asthma  Care affected by social determinants of health: None    Disposition considerations: Admission    At the conclusion of the encounter I discussed  the results of all of the tests and the disposition with patient.   All questions were answered.  The patient acknowledged understanding and was  involved in the decision making regarding the overall care plan.      MEDICATIONS GIVEN IN THE EMERGENCY:  Medications   piperacillin-tazobactam (ZOSYN) 3.375 g vial to attach to  mL bag (has no administration in time range)   sodium chloride 0.9% BOLUS 1,000 mL (1,000 mLs Intravenous $New Bag 9/24/23 2018)   morphine (PF) injection 4 mg (4 mg Intravenous $Given 9/24/23 2112)     =================================================================    HPI    Triage Note: Patient arrives EMS due to chest pain. Pain in chest since sat. Pt reports Covid Friday morning when he started to have ABD pain. Pt  has history of acid reflux. History of blood clots in lungs, on thinners, no sob. Patient took x 2 PRN lansoprazole.patient reports 4/10 c/p midsternal radiating to right ribs. Pt reports pain was up to 8/10 today. VSS. /88,80s,98%RA. . SR on EKG. Temp 98.7 oral. Patient also reports chills and fever yesterday. No N/V/D. No hx of seizures.     Triage Assessment       Row Name 09/24/23 1953       Triage Assessment (Adult)    Airway WDL WDL       Respiratory WDL    Respiratory WDL WDL       Skin Circulation/Temperature WDL    Skin Circulation/Temperature WDL WDL       Cardiac WDL    Cardiac WDL all       Chest Pain Assessment    Chest Pain Location midsternal;epigastric    Chest Pain Radiation other (see comments)  right ribs    Character aching    Duration since sat pain today was 8/10 thats when pt decided to come in    Associated Signs/Symptoms diaphoresis    Chest Pain Intervention cardiac biomarkers drawn;cardiac monitoring continued;12-lead ECG obtained;activity minimized       Peripheral/Neurovascular WDL    Peripheral Neurovascular WDL WDL       Cognitive/Neuro/Behavioral WDL    Cognitive/Neuro/Behavioral WDL WDL                  Patient information was obtained from: Patient    Use of : N/A         Pedro Chun is a 67 year old male who presents for evaluation of some upper  abdominal/lower chest pain that has been going on since yesterday afternoon.  Patient reports that he had his COVID vaccine on Friday morning and felt pretty awful on Friday evening and into Saturday.  He was having fatigue and joint pain.  He previously tolerated vaccines well.  Now he is noticing this ache in his upper abdomen and lower chest.  He got worse today and that is why he came in to get checked out.  He has a history of blood clots and is on thinners.  He has a history of hernia surgeries but still has his gallbladder.  He denies any nausea or vomiting but does report fevers and chills yesterday.  He denies urinary symptoms.  He denies any cardiac history or shortness of breath.    PAST MEDICAL HISTORY:  No past medical history on file.    PAST SURGICAL HISTORY:  No past surgical history on file.    CURRENT MEDICATIONS:      Current Facility-Administered Medications:     piperacillin-tazobactam (ZOSYN) 3.375 g vial to attach to  mL bag, 3.375 g, Intravenous, Once, Manjula Galo MD    Current Outpatient Medications:     acetaminophen (TYLENOL) 500 MG tablet, [ACETAMINOPHEN (TYLENOL) 500 MG TABLET] Take 1-2 tablets (500-1,000 mg total) by mouth every 6 (six) hours as needed for pain., Disp: , Rfl: 0    chlorthalidone (HYGROTEN) 25 MG tablet, [CHLORTHALIDONE (HYGROTEN) 25 MG TABLET] Take 25 mg by mouth daily., Disp: , Rfl:     lansoprazole (PREVACID) 30 MG capsule, [LANSOPRAZOLE (PREVACID) 30 MG CAPSULE] Take 30 mg by mouth daily., Disp: , Rfl:     levalbuterol (XOPENEX HFA) 45 mcg/actuation inhaler, [LEVALBUTEROL (XOPENEX HFA) 45 MCG/ACTUATION INHALER] Inhale 1-2 puffs every 4 (four) hours as needed for wheezing., Disp: , Rfl:     losartan (COZAAR) 100 MG tablet, [LOSARTAN (COZAAR) 100 MG TABLET] Take 100 mg by mouth daily., Disp: , Rfl:     methylcellulose (CITRUCEL) 500 mg Tab, [METHYLCELLULOSE (CITRUCEL) 500 MG TAB] Take 1,000 mg by mouth every morning., Disp: , Rfl:     mometasone  "(ASMANEX) 220 mcg (14 doses) inhaler, [MOMETASONE (ASMANEX) 220 MCG (14 DOSES) INHALER] Inhale 2 puffs daily., Disp: , Rfl:     multivitamin therapeutic tablet, [MULTIVITAMIN THERAPEUTIC TABLET] Take 1 tablet by mouth daily., Disp: , Rfl:     rivaroxaban 15 mg (42)- 20 mg (9) DsPk, [RIVAROXABAN 15 MG (42)- 20 MG (9) DSPK] Take 1 tablet (15mg) po bid for 21 days then take 1 tablet (20mg) daily, Disp: 51 tablet, Rfl: 0    ALLERGIES:  Allergies   Allergen Reactions    Betadine [Povidone Iodine] Rash       FAMILY HISTORY:  No family history on file.    SOCIAL HISTORY:   Social History     Socioeconomic History    Marital status: Single   Tobacco Use    Smoking status: Never    Smokeless tobacco: Never   Substance and Sexual Activity    Alcohol use: Yes    Drug use: No       PHYSICAL EXAM    VITAL SIGNS: BP (!) 162/99   Pulse 84   Temp 98.7  F (37.1  C) (Oral)   Resp 24   Ht 1.854 m (6' 1\")   Wt 96.2 kg (212 lb)   SpO2 95%   BMI 27.97 kg/m     GENERAL: Awake, alert, answering questions appropriately, no acute distress  SPEECH:  Easy to understand speech, Normal volume and christina  PULMONARY: No respiratory distress, Lungs clear to auscultation bilaterally  CARDIOVASCULAR: Regular rate and rhythm, Distal pulses present and normal.  ABDOMINAL: Soft, Nondistended, right upper quadrant tenderness, No rebound or guarding, No palpable masses  EXTREMITIES: No lower extremity edema.  PSYCH: Normal mood and affect     LAB:  All pertinent labs reviewed and interpreted.  Results for orders placed or performed during the hospital encounter of 09/24/23   Abdomen US, limited (RUQ only)    Impression    IMPRESSION:  1.  Cholelithiasis with gallbladder wall thickening. Despite a negative sonographic Osman's sign, findings would be suspicious for cholecystitis in the appropriate setting. Clinical correlation.    2.  Common bile duct is dilated up to 1.2 cm. This would be concerning for potential distal common bile duct stone or " obstructing process. MRCP suggested in further evaluation.    3.  Pancreas obscured by overlying bowel gas. Remainder of the exam is unremarkable.       XR Chest Port 1 View    Impression    IMPRESSION: The heart is normal in size. Left basilar discoid atelectasis is present.   Extra Blue Top Tube   Result Value Ref Range    Hold Specimen JIC    Extra Red Top Tube   Result Value Ref Range    Hold Specimen JIC    Extra Green Top (Lithium Heparin) Tube   Result Value Ref Range    Hold Specimen JIC    Extra Purple Top Tube   Result Value Ref Range    Hold Specimen JIC    Basic metabolic panel   Result Value Ref Range    Sodium 136 136 - 145 mmol/L    Potassium 3.3 (L) 3.4 - 5.3 mmol/L    Chloride 98 98 - 107 mmol/L    Carbon Dioxide (CO2) 24 22 - 29 mmol/L    Anion Gap 14 7 - 15 mmol/L    Urea Nitrogen 18.8 8.0 - 23.0 mg/dL    Creatinine 0.92 0.67 - 1.17 mg/dL    Calcium 9.2 8.8 - 10.2 mg/dL    Glucose 129 (H) 70 - 99 mg/dL    GFR Estimate >90 >60 mL/min/1.73m2   Hepatic function panel   Result Value Ref Range    Protein Total 7.3 6.4 - 8.3 g/dL    Albumin 4.1 3.5 - 5.2 g/dL    Bilirubin Total 1.0 <=1.2 mg/dL    Alkaline Phosphatase 176 (H) 40 - 129 U/L    AST 47 (H) 0 - 45 U/L    ALT 37 0 - 70 U/L    Bilirubin Direct 0.56 (H) 0.00 - 0.30 mg/dL   Result Value Ref Range    Lipase 43 13 - 60 U/L   Lactic acid whole blood   Result Value Ref Range    Lactic Acid 0.8 0.7 - 2.0 mmol/L   Result Value Ref Range    Troponin T, High Sensitivity 12 <=22 ng/L   Result Value Ref Range    Magnesium 1.6 (L) 1.7 - 2.3 mg/dL   CBC with platelets and differential   Result Value Ref Range    WBC Count 15.8 (H) 4.0 - 11.0 10e3/uL    RBC Count 5.28 4.40 - 5.90 10e6/uL    Hemoglobin 14.8 13.3 - 17.7 g/dL    Hematocrit 44.4 40.0 - 53.0 %    MCV 84 78 - 100 fL    MCH 28.0 26.5 - 33.0 pg    MCHC 33.3 31.5 - 36.5 g/dL    RDW 13.8 10.0 - 15.0 %    Platelet Count 270 150 - 450 10e3/uL    % Neutrophils 73 %    % Lymphocytes 13 %    % Monocytes 12  %    % Eosinophils 2 %    % Basophils 0 %    % Immature Granulocytes 0 %    NRBCs per 100 WBC 0 <1 /100    Absolute Neutrophils 11.5 (H) 1.6 - 8.3 10e3/uL    Absolute Lymphocytes 2.0 0.8 - 5.3 10e3/uL    Absolute Monocytes 1.9 (H) 0.0 - 1.3 10e3/uL    Absolute Eosinophils 0.3 0.0 - 0.7 10e3/uL    Absolute Basophils 0.0 0.0 - 0.2 10e3/uL    Absolute Immature Granulocytes 0.1 <=0.4 10e3/uL    Absolute NRBCs 0.0 10e3/uL       RADIOLOGY:  Abdomen US, limited (RUQ only)   Final Result   IMPRESSION:   1.  Cholelithiasis with gallbladder wall thickening. Despite a negative sonographic Osman's sign, findings would be suspicious for cholecystitis in the appropriate setting. Clinical correlation.      2.  Common bile duct is dilated up to 1.2 cm. This would be concerning for potential distal common bile duct stone or obstructing process. MRCP suggested in further evaluation.      3.  Pancreas obscured by overlying bowel gas. Remainder of the exam is unremarkable.            XR Chest Port 1 View   Final Result   IMPRESSION: The heart is normal in size. Left basilar discoid atelectasis is present.              EKG:    Date and time: September 24, 2023 at 2039  Rate: 82 bpm  Rhythm: Sinus rhythm  KS interval: 186 ms  QRS interval: 114 ms  QT/QTc: 372/434 ms  ST changes or T wave changes: No acute ST or T wave abnormalities  Change from prior ECG: No prior to compare to  I have independently reviewed and interpreted this EKG.     Manjula Galo M.D.  Emergency Medicine  [facility] EMERGENCY DEPARTMENT  [address] [facility] [place] [address]  [phone]  Dept: [phone]       Manjula Galo MD  09/24/23 8380

## 2023-09-25 NOTE — ANESTHESIA CARE TRANSFER NOTE
Patient: Pedro Chun    Procedure: Procedure(s):  CHOLECYSTECTOMY, LAPAROSCOPIC  CHOLANGIOGRAMS       Diagnosis: Acute cholecystitis [K81.0]  Diagnosis Additional Information: No value filed.    Anesthesia Type:   General     Note:    Oropharynx: oropharynx clear of all foreign objects and spontaneously breathing  Level of Consciousness: awake and drowsy  Oxygen Supplementation: face mask  Level of Supplemental Oxygen (L/min / FiO2): 6  Independent Airway: airway patency satisfactory and stable  Dentition: dentition unchanged  Vital Signs Stable: post-procedure vital signs reviewed and stable  Report to RN Given: handoff report given  Patient transferred to: PACU    Handoff Report: Identifed the Patient, Identified the Reponsible Provider, Reviewed the pertinent medical history, Discussed the surgical course, Reviewed Intra-OP anesthesia mangement and issues during anesthesia, Set expectations for post-procedure period and Allowed opportunity for questions and acknowledgement of understanding      Vitals:  Vitals Value Taken Time   /81 09/25/23  1635   Temp 97.6 F 09/25/23  1635   Pulse 91 09/25/23 1635   Resp 18 09/25/23 1635   SpO2 100 % 09/25/23 1635   Vitals shown include unvalidated device data.    Electronically Signed By: JAMES Mejia CRNA  September 25, 2023  4:37 PM

## 2023-09-25 NOTE — PROGRESS NOTES
[facility]    Medicine Progress Note - Hospitalist Service    Date of Admission:  9/24/2023    Assessment & Plan     67year-old male with medical history significant for lupus anticoagulant positive status, hx of prior PE(4 years ago), hypertension, hyperlipidemia, hearing loss, comes in with epigastric and right upper quadrant pain concerning for acute cholecystitis           Right upper quadrant pain/Epigastric --very much looks like acute mayra here , WBC up, LFT's and bili up, good story pain after eggs and sausage, more gerd lately. Keep npo, ivf, iv zosyn, Surgery to see for lap mayra and Intra-op cholangiogram likely     Pre-op eval- would not delay surgery. His EKG shows old RBBB-no acute changes , trops negative, no cardiac symptoms. I asked him where his pain was and he clearly points to epigastric and not to chest cavity at all. No need at this time for stress test     Hypertension-well controlled. On losartan holding with lower bp this am     History of lupus anticoagulant/PE- On DOAC--he notes he did not take this yesterday when he had gi upset--continue to told and since he did not take yesterday, even safer to have surgery today     Chest pain/epigastric pain. -Serial troponins -neg and EKG unchanged. I do not think he needs any further testing prior to surgery      Asthma- no exacerbation. Has inhalers     Hypokalemia/hypomag-replace     8.Asthma- optimize lunch cares              Diet:  npo  DVT Prophylaxis: Pneumatic Compression Devices  Guadalupe Catheter: Not present  Lines: None     Cardiac Monitoring: None  Code Status: Full Code      Clinically Significant Risk Factors Present on Admission        # Hypokalemia: Lowest K = 3.3 mmol/L in last 2 days, will replace as needed     # Hypomagnesemia: Lowest Mg = 1.6 mg/dL in last 2 days, will replace as needed    # Drug Induced Coagulation Defect: home medication list includes an anticoagulant medication    # Hypertension: Home  "medication list includes antihypertensive(s)      # Overweight: Estimated body mass index is 27.97 kg/m  as calculated from the following:    Height as of this encounter: 1.854 m (6' 1\").    Weight as of this encounter: 96.2 kg (212 lb).              Disposition Plan      Expected Discharge Date: 09/26/2023                  Sabra Narayanan MD  Hospitalist Service  [facility]  Securely message with [employer] (more info)  Text page via [employer] Paging/Directory   ______________________________________________________________________    Interval History   He notes pain started to get bad after eating eggs and sausage yesterday am  Felt bad enough he did not take DOAC yesterday  He notes pain has never been in chest  No cardiac hx    PE 4 years ago, he notes lupus anticoagulant positive    Physical Exam   Vital Signs: Temp: 98.7  F (37.1  C) Temp src: Oral BP: 106/67 Pulse: 89   Resp: 24 SpO2: 91 %      Weight: 212 lbs 0 oz    Constitutional: awake, alert, cooperative, and no apparent distress  Respiratory: No increased work of breathing, good air exchange, clear to auscultation bilaterally, no crackles or wheezing  Cardiovascular: Normal apical impulse, regular rate and rhythm, normal S1 and S2, no S3 or S4, and no murmur noted  GI: normal bowel sounds, soft, non-distended, and tenderness noted in the epigastric region and in the right upper quadrant Osman's sign is absent  Skin: no bruising or bleeding  Musculoskeletal: no lower extremity pitting edema present  Neurologic: Mental Status Exam:  Level of Alertness:   awake  Neuropsychiatric: General: normal, calm, and normal eye contact    Medical Decision Making       35 MINUTES SPENT BY ME on the date of service doing chart review, history, exam, documentation & further activities per the note.      Data     I have personally reviewed the following data over the past 24 hrs:    14.1 (H)  \   14.0   / 235     136 99 18.3 /  120 (H)   3.4 25 0.95 " \     ALT: 146 (H) AST: 182 (H) AP: 316 (H) TBILI: 2.7 (H)   ALB: 3.9 TOT PROTEIN: 7.0 LIPASE: 43     Trop: 14 BNP: N/A     Procal: N/A CRP: N/A Lactic Acid: 0.8         Imaging results reviewed over the past 24 hrs:   Recent Results (from the past 24 hour(s))   XR Chest Port 1 View    Narrative    EXAM: XR CHEST PORT 1 VIEW  LOCATION: [facility]  DATE: 9/24/2023    INDICATION: pain  COMPARISON: None.      Impression    IMPRESSION: The heart is normal in size. Left basilar discoid atelectasis is present.   Abdomen US, limited (RUQ only)    Narrative    EXAM: US ABDOMEN LIMITED  LOCATION: [facility]  DATE: 9/24/2023    INDICATION: RUQ pain.  COMPARISON: None.  TECHNIQUE: Limited abdominal ultrasound.    FINDINGS:    GALLBLADDER: Sludge and stone material in the gallbladder. Gallbladder wall is thickened and edematous at 7 mm. Sonographic Osman's sign is negative.    BILE DUCTS: Common bile duct is dilated up to 1.2 cm. Distal common bile duct is obscured by overlying bowel gas.    LIVER: Normal parenchyma with smooth contour. No focal mass.    RIGHT KIDNEY: No hydronephrosis.    PANCREAS: Obscured by overlying bowel gas.    No ascites.      Impression    IMPRESSION:  1.  Cholelithiasis with gallbladder wall thickening. Despite a negative sonographic Osman's sign, findings would be suspicious for cholecystitis in the appropriate setting. Clinical correlation.    2.  Common bile duct is dilated up to 1.2 cm. This would be concerning for potential distal common bile duct stone or obstructing process. MRCP suggested in further evaluation.    3.  Pancreas obscured by overlying bowel gas. Remainder of the exam is unremarkable.

## 2023-09-25 NOTE — ANESTHESIA PREPROCEDURE EVALUATION
Anesthesia Pre-Procedure Evaluation    Patient: Pedro Chun   MRN: [id number] : 1955        Procedure : Procedure(s):  CHOLECYSTECTOMY, LAPAROSCOPIC          Past Medical History:   Diagnosis Date    Hypertension       History reviewed. No pertinent surgical history.   Allergies   Allergen Reactions    Betadine [Povidone Iodine] Rash      Social History     Tobacco Use    Smoking status: Never    Smokeless tobacco: Never   Substance Use Topics    Alcohol use: Yes     Comment: very rare      Wt Readings from Last 1 Encounters:   23 96.2 kg (212 lb)        Anesthesia Evaluation   Pt has had prior anesthetic.     No history of anesthetic complications       ROS/MED HX  ENT/Pulmonary:  - neg pulmonary ROS     Neurologic:  - neg neurologic ROS     Cardiovascular:     (+)  hypertension- -   -  - -                                      METS/Exercise Tolerance: >4 METS    Hematologic:  - neg hematologic  ROS     Musculoskeletal:  - neg musculoskeletal ROS     GI/Hepatic:  - neg GI/hepatic ROS     Renal/Genitourinary:     (+) renal disease,             Endo:  - neg endo ROS     Psychiatric/Substance Use:  - neg psychiatric ROS     Infectious Disease:       Malignancy:       Other:            Physical Exam    Airway        Mallampati: IV   TM distance: > 3 FB   Neck ROM: full     Respiratory Devices and Support         Dental       (+) Modest Abnormalities - crowns, retainers, 1 or 2 missing teeth      Cardiovascular             Pulmonary           breath sounds clear to auscultation           OUTSIDE LABS:  CBC:   Lab Results   Component Value Date    WBC 14.1 (H) 2023    WBC 15.8 (H) 2023    HGB 14.0 2023    HGB 14.8 2023    HCT 41.9 2023    HCT 44.4 2023     2023     2023     BMP:   Lab Results   Component Value Date     2023     2023    POTASSIUM 3.4 2023    POTASSIUM 3.3 (L) 2023    CHLORIDE 99  09/25/2023    CHLORIDE 98 09/24/2023    CO2 25 09/25/2023    CO2 24 09/24/2023    BUN 18.3 09/25/2023    BUN 18.8 09/24/2023    CR 0.95 09/25/2023    CR 0.92 09/24/2023     (H) 09/25/2023     (H) 09/25/2023     COAGS: No results found for: PTT, INR, FIBR  POC: No results found for: BGM, HCG, HCGS  HEPATIC:   Lab Results   Component Value Date    ALBUMIN 3.9 09/25/2023    PROTTOTAL 7.0 09/25/2023     (H) 09/25/2023     (H) 09/25/2023    ALKPHOS 316 (H) 09/25/2023    BILITOTAL 2.7 (H) 09/25/2023     OTHER:   Lab Results   Component Value Date    LACT 0.8 09/24/2023    GABRIELLA 8.9 09/25/2023    MAG 1.7 09/25/2023    LIPASE 43 09/24/2023       Anesthesia Plan    ASA Status:  2    NPO Status:  NPO Appropriate    Anesthesia Type: General.     - Airway: ETT   Induction: Intravenous, Propofol.   Maintenance: Balanced.        Consents    Anesthesia Plan(s) and associated risks, benefits, and realistic alternatives discussed. Questions answered and patient/representative(s) expressed understanding.     - Discussed:     - Discussed with:  Patient            Postoperative Care    Pain management: Multi-modal analgesia.   PONV prophylaxis: Ondansetron (or other 5HT-3), Dexamethasone or Solumedrol     Comments:                Roxane Marx MD

## 2023-09-25 NOTE — ED NOTES
Report given to RN from surgery. Questions answered, pt informed of approx  JULIO CESAR time  of 30min.

## 2023-09-25 NOTE — PROGRESS NOTES
Gastroenterology Brief Note:    Chart reviewed, surgery planning lap mayra today. Liver test elevation noted concerning for possible CBD stone/obstruction. Will order liver tests in AM to trend and consider ERCP based on results or if IOC performed during surgery and is positive. Patient not seen today as he is in surgery but GI will see formally tomorrow.       (Dr. ANJUM Irving)  Yesenia Wills PA-C  9/25/2023 3:53 PM        GI addendum: IOC positive so will schedule patient for ERCP 9/26. Formal consult and discussion with patient in AM. NPO after midnight.      Yesenia Wills PA-C  9/25/2023 4:10 PM

## 2023-09-25 NOTE — MEDICATION SCRIBE - ADMISSION MEDICATION HISTORY
Medication Scribe Admission Medication History    Admission medication history is complete. The information provided in this note is only as accurate as the sources available at the time of the update.    Medication reconciliation/reorder completed by provider prior to medication history? No    Information Source(s): Patient via in-person    Pertinent Information:       Changes made to PTA medication list:  Added: Albuterol, Flovent, Xarelto  Deleted: levalbuterol, Asmanex  Changed: None    Medication Affordability:  Not including over the counter (OTC) medications, was there a time in the past 3 months when you did not take your medications as prescribed because of cost?: No    Allergies reviewed with patient and updates made in EHR: yes    Medication History Completed By: Jose Galdamez 9/24/2023 10:55 PM    Prior to Admission medications    Medication Sig Last Dose Taking? Auth Provider Long Term End Date   acetaminophen (TYLENOL) 500 MG tablet [ACETAMINOPHEN (TYLENOL) 500 MG TABLET] Take 1-2 tablets (500-1,000 mg total) by mouth every 6 (six) hours as needed for pain. More than a month at prn Yes Brant PEREZ MD     albuterol (PROAIR HFA/PROVENTIL HFA/VENTOLIN HFA) 108 (90 Base) MCG/ACT inhaler Inhale 2 puffs into the lungs every 6 hours as needed for shortness of breath Past Month at prn Yes Reported, Patient Yes    chlorthalidone (HYGROTEN) 25 MG tablet Take 25 mg by mouth every morning 9/23/2023 at am Yes Provider, Historical Yes    FLOVENT DISKUS 100 MCG/ACT inhaler Inhale 1 puff into the lungs 2 times daily 9/23/2023 at pm Yes Reported, Patient Yes    lansoprazole (PREVACID) 30 MG capsule Take 30 mg by mouth every morning 9/24/2023 at am Yes Provider, Historical     losartan (COZAAR) 100 MG tablet Take 100 mg by mouth every morning 9/23/2023 at am Yes Provider, Historical Yes    methylcellulose (CITRUCEL) 500 mg Tab [METHYLCELLULOSE (CITRUCEL) 500 MG TAB] Take 1,000 mg by mouth every morning. 9/23/2023 at am Yes  Provider, Historical     multivitamin therapeutic tablet Take 1 tablet by mouth every morning 9/23/2023 at am Yes Provider, Historical     XARELTO ANTICOAGULANT 20 MG TABS tablet Take 20 mg by mouth every morning 9/23/2023 at am Yes Reported, Patient Yes

## 2023-09-25 NOTE — ED NOTES
Pt returned from bathroom.  Hooked back up to monitor, assisted with adjusting bed and settling in for the night.

## 2023-09-25 NOTE — H&P
"[facility]    History and Physical - Hospitalist Service       Date of Admission:  9/24/2023    Assessment & Plan      Patient is a 67year-old male with medical history significant for lupus, hypertension, hyperlipidemia, prior history of PE, hearing loss, and other medical comorbidities who is being admitted with right upper quadrant pain concerning for acute cholecystitis and possible acute cholecystitis    Patient Active Problem List   Diagnosis Code    Acute pulmonary embolism (H) I26.99    Pulmonary embolism on left (H) I26.99    Hydronephrosis of left kidney N13.30    Leukocytosis, unspecified type D72.829    Acute cholecystitis K81.0          Right upper quadrant pain- History suggestive of possible PUD. Ultra sound showed possible gall bladder inflammation     Acute cholecystitis- Zofyn was started. Will continue Appreciate GI and general surgery input    Hypertension-well controlled.     History of lupus- on coumadin    Chest pain. -Serial troponins and if negative stress test in AM-Pain meds as needed recheck tte    Asthma- uncomplicated     Hypokalemia-replete    Hypomagnesemia- replete     Asthma- optimize lunch cares    Hx of PE- resume anticoagulation after       Diet:  NPO  DVT Prophylaxis: was on xarelto which is on hold now.     Guadalupe Catheter: Not present  Lines: None     Cardiac Monitoring: None  Code Status:  Full  Rest of atient'    Clinically Significant Risk Factors Present on Admission        # Hypokalemia: Lowest K = 3.3 mmol/L in last 2 days, will replace as needed     # Hypomagnesemia: Lowest Mg = 1.6 mg/dL in last 2 days, will replace as needed    # Drug Induced Coagulation Defect: home medication list includes an anticoagulant medication    # Hypertension: Home medication list includes antihypertensive(s)      # Overweight: Estimated body mass index is 27.97 kg/m  as calculated from the following:    Height as of this encounter: 1.854 m (6' 1\").    Weight as of " this encounter: 96.2 kg (212 lb).              Disposition Plan      Expected Discharge Date: 09/25/2023                  Caroline Dueñas MD  Hospitalist Service  [facility]  Securely message with [employer] (more info)  Text page via [employer] Paging/Directory     ______________________________________________________________________    Chief Complaint   Chest pain  RUQ pain    History is obtained from the patient    History of Present Illness   Patient is a 23-year-old male with medical history significant for lupus, hypertension, hyperlipidemia, prior history of PE, hearing loss, and other medical comorbidities who is being admitted with right upper quadrant pain concerning for acute cholecystitis and possible acute cholecystitis    Patient arrives EMS due to chest pain. Pain in chest since sat. Pt reports he got the  Covid vaccine Friday morning. He subsequently developed epigastric pain   th next day. The next day he felt tired and  achy He also developed epigastric pain that was non radiating        Narrative & Impression   EXAM: US ABDOMEN LIMITED  LOCATION: [facility]  DATE: 9/24/2023     INDICATION: RUQ pain.  COMPARISON: None.  TECHNIQUE: Limited abdominal ultrasound.     FINDINGS:     GALLBLADDER: Sludge and stone material in the gallbladder. Gallbladder wall is thickened and edematous at 7 mm. Sonographic Osman's sign is negative.     BILE DUCTS: Common bile duct is dilated up to 1.2 cm. Distal common bile duct is obscured by overlying bowel gas.     LIVER: Normal parenchyma with smooth contour. No focal mass.     RIGHT KIDNEY: No hydronephrosis.     PANCREAS: Obscured by overlying bowel gas.     No ascites.                                                                      IMPRESSION:  1.  Cholelithiasis with gallbladder wall thickening. Despite a negative sonographic Osman's sign, findings would be suspicious for cholecystitis in the appropriate setting.  Clinical correlation.     2.  Common bile duct is dilated up to 1.2 cm. This would be concerning for potential distal common bile duct stone or obstructing process. MRCP suggested in further evaluation.     3.  Pancreas obscured by overlying bowel gas. Remainder of the exam is unremarkable.                   VSS. /88,80s,98%RA. . SR on EKG. Temp 98.7 oral. Patient also reports chills and fever yesterday. No N/V/D. No hx of seizures.     RUQ pain coming in for RUQ pn    Past Medical History    HTN (hypertension)  GERD (gastroesophageal reflux disease)  Asthma    Overview:    Formatting of this note might be different from the original.  mild, persistent  Hearing loss  PE (pulmonary thromboembolism)    Overview:    Formatting of this note might be different from the original.  18, unprovoked  Lupus anticoagulant disorder    Medical History  Medical History Date Comments   HTN (hypertension)       GERD (gastroesophageal reflux disease)       Asthma   mild, persistent   Hearing loss       PE (pulmonary thromboembolism) (HC)   19, unprovoked   Lupus anticoagulant disorder (HC)     Patient Active Problem List   Diagnosis Code   HTN (hypertension) I10   GERD (gastroesophageal reflux disease) K21.9   Asthma J45.909   Hearing loss H91.90   PE (pulmonary thromboembolism) (HC) I26.99   Lupus anticoagulant disorder (HC) D68.62       Past Surgical History   Family History  Medical History Relation Name Comments   Stroke Brother       Heart Disease Father   cad   Cancer Mother   lymphoma     Family History  Relation Name Status Comments   Brother         Father   Alive     Mother    (Age 82) Lymphoma     Social History    Tobacco Use Types Packs/Day Years Used Date   Smoking Tobacco: Never           Smokeless Tobacco: Never             Social History  Alcohol Use Standard Drinks/Week Comments   Yes 0 (1 standard drink = 0.6 oz pure alcohol) rare     Social History  PHQ-2 Answer Date Recorded   PHQ-2  TOTAL SCORE 0 08/15/2022     Social History  Social Connections Answer Date Recorded   Frequency of Communication with Friends and Family Not on file       Social History  Financial Resource Strain Answer Date Recorded   Difficulty of Paying Living Expenses 3     Difficulty of Paying Living Expenses Not on file       Social History  Food Insecurity Answer Date Recorded   Worried About Running Out of Food in the Last Year 1       Social History  Transportation Needs Answer Date Recorded   Lack of Transportation (Medical) 1       Social History  Housing Stability Answer Date Recorded   Unable to Pay for Housing in the Last Year 1       Social History  Sex and Gender Information Value Date Recorded   Sex Assigned at Birth Not on file     Gender Identity Not on file     Sexual Orientation Not on file       Prior to Admission Medications   Prior to Admission Medications   Prescriptions Last Dose Informant Patient Reported? Taking?   acetaminophen (TYLENOL) 500 MG tablet   No No   Sig: [ACETAMINOPHEN (TYLENOL) 500 MG TABLET] Take 1-2 tablets (500-1,000 mg total) by mouth every 6 (six) hours as needed for pain.   chlorthalidone (HYGROTEN) 25 MG tablet   Yes No   Sig: [CHLORTHALIDONE (HYGROTEN) 25 MG TABLET] Take 25 mg by mouth daily.   lansoprazole (PREVACID) 30 MG capsule   Yes No   Sig: [LANSOPRAZOLE (PREVACID) 30 MG CAPSULE] Take 30 mg by mouth daily.   levalbuterol (XOPENEX HFA) 45 mcg/actuation inhaler   Yes No   Sig: [LEVALBUTEROL (XOPENEX HFA) 45 MCG/ACTUATION INHALER] Inhale 1-2 puffs every 4 (four) hours as needed for wheezing.   losartan (COZAAR) 100 MG tablet   Yes No   Sig: [LOSARTAN (COZAAR) 100 MG TABLET] Take 100 mg by mouth daily.   methylcellulose (CITRUCEL) 500 mg Tab   Yes No   Sig: [METHYLCELLULOSE (CITRUCEL) 500 MG TAB] Take 1,000 mg by mouth every morning.   mometasone (ASMANEX) 220 mcg (14 doses) inhaler   Yes No   Sig: [MOMETASONE (ASMANEX) 220 MCG (14 DOSES) INHALER] Inhale 2 puffs daily.    multivitamin therapeutic tablet   Yes No   Sig: [MULTIVITAMIN THERAPEUTIC TABLET] Take 1 tablet by mouth daily.   rivaroxaban 15 mg (42)- 20 mg (9) DsPk   No No   Sig: [RIVAROXABAN 15 MG (42)- 20 MG (9) DSPK] Take 1 tablet (15mg) po bid for 21 days then take 1 tablet (20mg) daily      Facility-Administered Medications: None        Review of Systems    The 10 point Review of Systems is negative other than noted in the HPI or here.     Social History   I have reviewed this patient's social history and updated it with pertinent information if needed.  Social History     Tobacco Use    Smoking status: Never    Smokeless tobacco: Never   Substance Use Topics    Alcohol use: Yes    Drug use: No         Family History         Allergies   Allergies   Allergen Reactions    Betadine [Povidone Iodine] Rash        Physical Exam   Vital Signs: Temp: 98.7  F (37.1  C) Temp src: Oral BP: (!) 162/99 Pulse: 84   Resp: 24 SpO2: 95 %      Weight: 212 lbs 0 oz      General Aox3, appropriate affect, NAD, on RA  HEENT  MMM, EOMI, PERRL  Chest Adeq E b/l, No wheezing  Heart RRR, No M/R/G  Abd- Soft, NT, BS+  - deterred  Extremity- Moving all extremities, No digital clubbing,   No edema  Neuro- Aox3, CN II- XII intact, Peripheral vision   P5/5, T-N, Reflexes 2+, Plantar reflex downwards,  gait not checked  Skin  Has no tattoo, rash     Medical Decision Making       90 MINUTES SPENT BY ME on the date of service doing chart review, history, exam, documentation & further activities per the note.  ------------------ MEDICAL DECISION MAKING ------------------------------------------------------------------------------------------------------      Data     I have personally reviewed the following data over the past 24 hrs:    15.8 (H)  \   14.8   / 270     136 98 18.8 /  129 (H)   3.3 (L) 24 0.92 \     ALT: 37 AST: 47 (H) AP: 176 (H) TBILI: 1.0   ALB: 4.1 TOT PROTEIN: 7.3 LIPASE: 43     Trop: 12 BNP: N/A     Procal: N/A CRP: N/A Lactic Acid:  0.8         Imaging results reviewed over the past 24 hrs:   Recent Results (from the past 24 hour(s))   XR Chest Port 1 View    Narrative    EXAM: XR CHEST PORT 1 VIEW  LOCATION: [facility]  DATE: 9/24/2023    INDICATION: pain  COMPARISON: None.      Impression    IMPRESSION: The heart is normal in size. Left basilar discoid atelectasis is present.   Abdomen US, limited (RUQ only)    Narrative    EXAM: US ABDOMEN LIMITED  LOCATION: [facility]  DATE: 9/24/2023    INDICATION: RUQ pain.  COMPARISON: None.  TECHNIQUE: Limited abdominal ultrasound.    FINDINGS:    GALLBLADDER: Sludge and stone material in the gallbladder. Gallbladder wall is thickened and edematous at 7 mm. Sonographic Osman's sign is negative.    BILE DUCTS: Common bile duct is dilated up to 1.2 cm. Distal common bile duct is obscured by overlying bowel gas.    LIVER: Normal parenchyma with smooth contour. No focal mass.    RIGHT KIDNEY: No hydronephrosis.    PANCREAS: Obscured by overlying bowel gas.    No ascites.      Impression    IMPRESSION:  1.  Cholelithiasis with gallbladder wall thickening. Despite a negative sonographic Osman's sign, findings would be suspicious for cholecystitis in the appropriate setting. Clinical correlation.    2.  Common bile duct is dilated up to 1.2 cm. This would be concerning for potential distal common bile duct stone or obstructing process. MRCP suggested in further evaluation.    3.  Pancreas obscured by overlying bowel gas. Remainder of the exam is unremarkable.

## 2023-09-25 NOTE — CONSULTS
General Surgery Consultation  Pedro Chun MRN# [id number]   Age/Sex: 67 year old male YOB: 1955     Reason for consult: 1. Acute cholecystitis            Requesting physician: MARGARITO                   Assessment and Plan:   Assessment:  Acute cholecystitis with cholelithiasis    Plan:  NPO  Finn nunez today          Chief Complaint:     Chief Complaint   Patient presents with    Chest Pain    Fatigue        History is obtained from the patient    HPI:   Pedro Chun is a 67 year old male who presents with 2 day history of abdominal pain. Noticed myalgia after covid booster on Friday but Saturday that improved and he developed epigastric pain that radiated across upper abdomen. This was associated with nausea and anorexia. He tried PPI for reflux suspicion without relief. This worsened and patient presented to the ED. No fever, chills, SOB, CP, diarrhea, constipation, vomiting, melena, acholic stool, rashes or lesion.    Normally on xarelto for history of PE. Has not taken this since Friday          Past Medical History:   No past medical history on file.           Past Surgical History:   No past surgical history on file.          Social History:    reports that he has never smoked. He has never used smokeless tobacco. He reports current alcohol use. He reports that he does not use drugs.           Family History:   No family history on file.           Allergies:     Allergies   Allergen Reactions    Betadine [Povidone Iodine] Rash              Medications:     Prior to Admission medications    Medication Sig Start Date End Date Taking? Authorizing Provider   acetaminophen (TYLENOL) 500 MG tablet [ACETAMINOPHEN (TYLENOL) 500 MG TABLET] Take 1-2 tablets (500-1,000 mg total) by mouth every 6 (six) hours as needed for pain. 1/9/19  Yes Brant PEREZ MD   albuterol (PROAIR HFA/PROVENTIL HFA/VENTOLIN HFA) 108 (90 Base) MCG/ACT inhaler Inhale 2 puffs into the lungs every 6 hours as needed for  shortness of breath 9/6/23  Yes Reported, Patient   chlorthalidone (HYGROTEN) 25 MG tablet Take 25 mg by mouth every morning 1/8/19  Yes Provider, Historical   FLOVENT DISKUS 100 MCG/ACT inhaler Inhale 1 puff into the lungs 2 times daily 9/6/23  Yes Reported, Patient   lansoprazole (PREVACID) 30 MG capsule Take 30 mg by mouth every morning 1/8/19  Yes Provider, Historical   losartan (COZAAR) 100 MG tablet Take 100 mg by mouth every morning 1/8/19  Yes Provider, Historical   methylcellulose (CITRUCEL) 500 mg Tab [METHYLCELLULOSE (CITRUCEL) 500 MG TAB] Take 1,000 mg by mouth every morning. 1/8/19  Yes Provider, Historical   multivitamin therapeutic tablet Take 1 tablet by mouth every morning 1/8/19  Yes Provider, Historical   XARELTO ANTICOAGULANT 20 MG TABS tablet Take 20 mg by mouth every morning 7/17/23  Yes Reported, Patient              Review of Systems:   The Review of Systems is negative other than noted in the HPI            Physical Exam:   Patient Vitals for the past 24 hrs:   BP Temp Temp src Pulse Resp SpO2 Height Weight   09/25/23 1149 -- -- -- 86 -- 93 % -- --   09/25/23 1049 -- -- -- 91 -- 95 % -- --   09/25/23 0949 -- -- -- 91 -- 94 % -- --   09/25/23 0849 -- -- -- 86 -- 92 % -- --   09/25/23 0749 -- -- -- 90 -- 93 % -- --   09/25/23 0725 138/81 -- -- 92 -- 95 % -- --   09/25/23 0655 110/72 -- -- 85 -- 93 % -- --   09/25/23 0625 105/70 -- -- 79 -- 93 % -- --   09/25/23 0400 106/67 -- -- 89 -- 91 % -- --   09/25/23 0330 114/69 -- -- 94 -- 90 % -- --   09/25/23 0300 123/82 -- -- 100 -- 93 % -- --   09/25/23 0004 131/78 -- -- 82 -- 93 % -- --   09/24/23 2303 (!) 143/70 -- -- 92 -- 93 % -- --   09/24/23 2045 (!) 162/99 -- -- 84 24 95 % -- --   09/24/23 2030 (!) 149/87 -- -- 80 12 96 % -- --   09/24/23 2019 -- -- -- 80 22 96 % -- --   09/24/23 2018 -- -- -- 81 22 96 % -- --   09/24/23 2017 -- -- -- 82 13 97 % -- --   09/24/23 2016 -- -- -- 81 14 96 % -- --   09/24/23 2015 (!) 142/82 -- -- 80 19 97 % -- --  "  09/24/23 2002 -- 98.7  F (37.1  C) Oral -- -- -- -- --   09/24/23 2001 -- -- -- -- -- -- 1.854 m (6' 1\") 96.2 kg (212 lb)   09/1955 -- -- -- 85 21 -- -- --   09/24/23 1953 (!) 152/79 -- -- 83 18 -- -- --   09/24/23 1952 -- -- -- 82 26 -- -- --        No intake or output data in the 24 hours ending 09/25/23 1210   Constitutional:   awake, alert, cooperative, no apparent distress, and appears stated age       Eyes:   PERRL, conjunctiva/corneas clear, EOM's intact; no scleral edema or icterus noted        ENT:   Normocephalic, without obvious abnormality, atraumatic, Lips, mucosa, and tongue normal          Lungs:   Normal respiratory effort, no accessory muscle use       Cardiovascular:   Regular rate and rhythm       Abdomen:   Soft, tender in RUQ, not distended       Musculoskeletal:   No obvious swelling, bruising or deformity       Skin:   Skin color and texture normal for patient, no rashes or lesions              Data:         All imaging studies reviewed by me.    Results for orders placed or performed during the hospital encounter of 09/24/23 (from the past 24 hour(s))   Melvin Draw    Narrative    The following orders were created for panel order Melvin Draw.  Procedure                               Abnormality         Status                     ---------                               -----------         ------                     Extra Blue Top Tube[[id number]]                              Final result               Extra Red Top Tube[[id number]]                               Final result               Extra Green Top (Lithium...[[id number]]                      Final result               Extra Purple Top Tube[[id number]]                            Final result                 Please view results for these tests on the individual orders.   Extra Blue Top Tube   Result Value Ref Range    Hold Specimen JIC    Extra Red Top Tube   Result Value Ref Range    Hold Specimen JIC    Extra Green Top (Lithium " Heparin) Tube   Result Value Ref Range    Hold Specimen JIC    Extra Purple Top Tube   Result Value Ref Range    Hold Specimen JIC    CBC with platelets differential    Narrative    The following orders were created for panel order CBC with platelets differential.  Procedure                               Abnormality         Status                     ---------                               -----------         ------                     CBC with platelets and d...[[id number]]  Abnormal            Final result                 Please view results for these tests on the individual orders.   Basic metabolic panel   Result Value Ref Range    Sodium 136 136 - 145 mmol/L    Potassium 3.3 (L) 3.4 - 5.3 mmol/L    Chloride 98 98 - 107 mmol/L    Carbon Dioxide (CO2) 24 22 - 29 mmol/L    Anion Gap 14 7 - 15 mmol/L    Urea Nitrogen 18.8 8.0 - 23.0 mg/dL    Creatinine 0.92 0.67 - 1.17 mg/dL    Calcium 9.2 8.8 - 10.2 mg/dL    Glucose 129 (H) 70 - 99 mg/dL    GFR Estimate >90 >60 mL/min/1.73m2   Hepatic function panel   Result Value Ref Range    Protein Total 7.3 6.4 - 8.3 g/dL    Albumin 4.1 3.5 - 5.2 g/dL    Bilirubin Total 1.0 <=1.2 mg/dL    Alkaline Phosphatase 176 (H) 40 - 129 U/L    AST 47 (H) 0 - 45 U/L    ALT 37 0 - 70 U/L    Bilirubin Direct 0.56 (H) 0.00 - 0.30 mg/dL   Lipase   Result Value Ref Range    Lipase 43 13 - 60 U/L   Troponin T, High Sensitivity   Result Value Ref Range    Troponin T, High Sensitivity 12 <=22 ng/L   Magnesium   Result Value Ref Range    Magnesium 1.6 (L) 1.7 - 2.3 mg/dL   CBC with platelets and differential   Result Value Ref Range    WBC Count 15.8 (H) 4.0 - 11.0 10e3/uL    RBC Count 5.28 4.40 - 5.90 10e6/uL    Hemoglobin 14.8 13.3 - 17.7 g/dL    Hematocrit 44.4 40.0 - 53.0 %    MCV 84 78 - 100 fL    MCH 28.0 26.5 - 33.0 pg    MCHC 33.3 31.5 - 36.5 g/dL    RDW 13.8 10.0 - 15.0 %    Platelet Count 270 150 - 450 10e3/uL    % Neutrophils 73 %    % Lymphocytes 13 %    % Monocytes 12 %    %  Eosinophils 2 %    % Basophils 0 %    % Immature Granulocytes 0 %    NRBCs per 100 WBC 0 <1 /100    Absolute Neutrophils 11.5 (H) 1.6 - 8.3 10e3/uL    Absolute Lymphocytes 2.0 0.8 - 5.3 10e3/uL    Absolute Monocytes 1.9 (H) 0.0 - 1.3 10e3/uL    Absolute Eosinophils 0.3 0.0 - 0.7 10e3/uL    Absolute Basophils 0.0 0.0 - 0.2 10e3/uL    Absolute Immature Granulocytes 0.1 <=0.4 10e3/uL    Absolute NRBCs 0.0 10e3/uL   Lactic acid whole blood   Result Value Ref Range    Lactic Acid 0.8 0.7 - 2.0 mmol/L   XR Chest Port 1 View    Narrative    EXAM: XR CHEST PORT 1 VIEW  LOCATION: [facility]  DATE: 9/24/2023    INDICATION: pain  COMPARISON: None.      Impression    IMPRESSION: The heart is normal in size. Left basilar discoid atelectasis is present.   Abdomen US, limited (RUQ only)    Narrative    EXAM: US ABDOMEN LIMITED  LOCATION: [facility]  DATE: 9/24/2023    INDICATION: RUQ pain.  COMPARISON: None.  TECHNIQUE: Limited abdominal ultrasound.    FINDINGS:    GALLBLADDER: Sludge and stone material in the gallbladder. Gallbladder wall is thickened and edematous at 7 mm. Sonographic Osman's sign is negative.    BILE DUCTS: Common bile duct is dilated up to 1.2 cm. Distal common bile duct is obscured by overlying bowel gas.    LIVER: Normal parenchyma with smooth contour. No focal mass.    RIGHT KIDNEY: No hydronephrosis.    PANCREAS: Obscured by overlying bowel gas.    No ascites.      Impression    IMPRESSION:  1.  Cholelithiasis with gallbladder wall thickening. Despite a negative sonographic Osman's sign, findings would be suspicious for cholecystitis in the appropriate setting. Clinical correlation.    2.  Common bile duct is dilated up to 1.2 cm. This would be concerning for potential distal common bile duct stone or obstructing process. MRCP suggested in further evaluation.    3.  Pancreas obscured by overlying bowel gas. Remainder of the exam is unremarkable.        Asymptomatic COVID-19 Virus (Coronavirus) by PCR Nasopharyngeal    Specimen: Nasopharyngeal; Swab   Result Value Ref Range    SARS CoV2 PCR Negative Negative    Narrative    Testing was performed using the Hubs1ert Xpress SARS-CoV-2 Assay on the Cepheid Gene-Xpert Instrument Systems. Additional information about this Emergency Use Authorization (EUA) assay can be found via the Lab Guide. This test should be ordered for the detection of SARS-CoV-2 in individuals who meet SARS-CoV-2 clinical and/or epidemiological criteria as well as from individuals without symptoms or other reasons to suspect COVID-19. Test performance for asymptomatic patients has only been established in anterior nasal swab specimens. This test is for in vitro diagnostic use under the FDA EUA for laboratories certified under CLIA to perform high complexity testing. This test has not been FDA cleared or approved. A negative result does not rule out the presence of PCR inhibitors in the specimen or target RNA concentration below the limit of detection for the assay. The possibility of a false negative should be considered if the patient's recent exposure or clinical presentation suggests COVID-19.. This test was validated by the [facility]. This laboratory is certified under the Clinical Laboratory Improvement Amendments (CLIA) as qualified to perform high complexity laboratory testing.     CBC with platelets differential    Narrative    The following orders were created for panel order CBC with platelets differential.  Procedure                               Abnormality         Status                     ---------                               -----------         ------                     CBC with platelets and d...[[id number]]  Abnormal            Final result                 Please view results for these tests on the individual orders.   Comprehensive metabolic panel   Result Value Ref Range    Sodium 136 136  - 145 mmol/L    Potassium 3.4 3.4 - 5.3 mmol/L    Chloride 99 98 - 107 mmol/L    Carbon Dioxide (CO2) 25 22 - 29 mmol/L    Anion Gap 12 7 - 15 mmol/L    Urea Nitrogen 18.3 8.0 - 23.0 mg/dL    Creatinine 0.95 0.67 - 1.17 mg/dL    Calcium 8.9 8.8 - 10.2 mg/dL    Glucose 120 (H) 70 - 99 mg/dL    Alkaline Phosphatase 316 (H) 40 - 129 U/L     (H) 0 - 45 U/L     (H) 0 - 70 U/L    Protein Total 7.0 6.4 - 8.3 g/dL    Albumin 3.9 3.5 - 5.2 g/dL    Bilirubin Total 2.7 (H) <=1.2 mg/dL    GFR Estimate 87 >60 mL/min/1.73m2   CBC with platelets and differential   Result Value Ref Range    WBC Count 14.1 (H) 4.0 - 11.0 10e3/uL    RBC Count 4.94 4.40 - 5.90 10e6/uL    Hemoglobin 14.0 13.3 - 17.7 g/dL    Hematocrit 41.9 40.0 - 53.0 %    MCV 85 78 - 100 fL    MCH 28.3 26.5 - 33.0 pg    MCHC 33.4 31.5 - 36.5 g/dL    RDW 13.8 10.0 - 15.0 %    Platelet Count 235 150 - 450 10e3/uL    % Neutrophils 85 %    % Lymphocytes 5 %    % Monocytes 10 %    % Eosinophils 0 %    % Basophils 0 %    % Immature Granulocytes 0 %    NRBCs per 100 WBC 0 <1 /100    Absolute Neutrophils 11.9 (H) 1.6 - 8.3 10e3/uL    Absolute Lymphocytes 0.7 (L) 0.8 - 5.3 10e3/uL    Absolute Monocytes 1.4 (H) 0.0 - 1.3 10e3/uL    Absolute Eosinophils 0.0 0.0 - 0.7 10e3/uL    Absolute Basophils 0.0 0.0 - 0.2 10e3/uL    Absolute Immature Granulocytes 0.0 <=0.4 10e3/uL    Absolute NRBCs 0.0 10e3/uL   Troponin T, High Sensitivity   Result Value Ref Range    Troponin T, High Sensitivity 14 <=22 ng/L   Magnesium   Result Value Ref Range    Magnesium 1.7 1.7 - 2.3 mg/dL        Criselda Lawler, APRN CNP

## 2023-09-26 ENCOUNTER — ANESTHESIA (OUTPATIENT)
Dept: SURGERY | Facility: HOSPITAL | Age: 68
DRG: 418 | End: 2023-09-26
Payer: COMMERCIAL

## 2023-09-26 ENCOUNTER — APPOINTMENT (OUTPATIENT)
Dept: RADIOLOGY | Facility: HOSPITAL | Age: 68
DRG: 418 | End: 2023-09-26
Attending: INTERNAL MEDICINE
Payer: COMMERCIAL

## 2023-09-26 ENCOUNTER — ANESTHESIA EVENT (OUTPATIENT)
Dept: SURGERY | Facility: HOSPITAL | Age: 68
DRG: 418 | End: 2023-09-26
Payer: COMMERCIAL

## 2023-09-26 LAB
ALBUMIN SERPL BCG-MCNC: 3.6 G/DL (ref 3.5–5.2)
ALP SERPL-CCNC: 255 U/L (ref 40–129)
ALT SERPL W P-5'-P-CCNC: 119 U/L (ref 0–70)
ANION GAP SERPL CALCULATED.3IONS-SCNC: 10 MMOL/L (ref 7–15)
AST SERPL W P-5'-P-CCNC: 89 U/L (ref 0–45)
BILIRUB DIRECT SERPL-MCNC: 3.05 MG/DL (ref 0–0.3)
BILIRUB SERPL-MCNC: 3.8 MG/DL
BUN SERPL-MCNC: 16.5 MG/DL (ref 8–23)
CALCIUM SERPL-MCNC: 8.6 MG/DL (ref 8.8–10.2)
CHLORIDE SERPL-SCNC: 100 MMOL/L (ref 98–107)
CREAT SERPL-MCNC: 0.86 MG/DL (ref 0.67–1.17)
DEPRECATED HCO3 PLAS-SCNC: 26 MMOL/L (ref 22–29)
EGFRCR SERPLBLD CKD-EPI 2021: >90 ML/MIN/1.73M2
ERCP: NORMAL
ERYTHROCYTE [DISTWIDTH] IN BLOOD BY AUTOMATED COUNT: 14 % (ref 10–15)
GLUCOSE SERPL-MCNC: 148 MG/DL (ref 70–99)
HCT VFR BLD AUTO: 42.8 % (ref 40–53)
HGB BLD-MCNC: 13.8 G/DL (ref 13.3–17.7)
MAGNESIUM SERPL-MCNC: 2.3 MG/DL (ref 1.7–2.3)
MCH RBC QN AUTO: 27.8 PG (ref 26.5–33)
MCHC RBC AUTO-ENTMCNC: 32.2 G/DL (ref 31.5–36.5)
MCV RBC AUTO: 86 FL (ref 78–100)
PLATELET # BLD AUTO: 228 10E3/UL (ref 150–450)
POTASSIUM SERPL-SCNC: 3.6 MMOL/L (ref 3.4–5.3)
PROT SERPL-MCNC: 6.8 G/DL (ref 6.4–8.3)
RBC # BLD AUTO: 4.97 10E6/UL (ref 4.4–5.9)
SODIUM SERPL-SCNC: 136 MMOL/L (ref 136–145)
WBC # BLD AUTO: 16.8 10E3/UL (ref 4–11)

## 2023-09-26 PROCEDURE — 710N000009 HC RECOVERY PHASE 1, LEVEL 1, PER MIN: Performed by: INTERNAL MEDICINE

## 2023-09-26 PROCEDURE — 250N000011 HC RX IP 250 OP 636: Mod: JZ | Performed by: INTERNAL MEDICINE

## 2023-09-26 PROCEDURE — 999N000182 XR SURGERY CARM FLUORO GREATER THAN 5 MIN

## 2023-09-26 PROCEDURE — 999N000141 HC STATISTIC PRE-PROCEDURE NURSING ASSESSMENT: Performed by: INTERNAL MEDICINE

## 2023-09-26 PROCEDURE — 83735 ASSAY OF MAGNESIUM: CPT | Performed by: INTERNAL MEDICINE

## 2023-09-26 PROCEDURE — 250N000013 HC RX MED GY IP 250 OP 250 PS 637: Performed by: SURGERY

## 2023-09-26 PROCEDURE — 99232 SBSQ HOSP IP/OBS MODERATE 35: CPT | Performed by: FAMILY MEDICINE

## 2023-09-26 PROCEDURE — 120N000001 HC R&B MED SURG/OB

## 2023-09-26 PROCEDURE — 82248 BILIRUBIN DIRECT: CPT | Performed by: INTERNAL MEDICINE

## 2023-09-26 PROCEDURE — 255N000002 HC RX 255 OP 636: Performed by: INTERNAL MEDICINE

## 2023-09-26 PROCEDURE — 0FC98ZZ EXTIRPATION OF MATTER FROM COMMON BILE DUCT, VIA NATURAL OR ARTIFICIAL OPENING ENDOSCOPIC: ICD-10-PCS | Performed by: INTERNAL MEDICINE

## 2023-09-26 PROCEDURE — 36415 COLL VENOUS BLD VENIPUNCTURE: CPT | Performed by: INTERNAL MEDICINE

## 2023-09-26 PROCEDURE — 250N000025 HC SEVOFLURANE, PER MIN: Performed by: INTERNAL MEDICINE

## 2023-09-26 PROCEDURE — 272N000001 HC OR GENERAL SUPPLY STERILE: Performed by: INTERNAL MEDICINE

## 2023-09-26 PROCEDURE — 258N000003 HC RX IP 258 OP 636: Performed by: NURSE ANESTHETIST, CERTIFIED REGISTERED

## 2023-09-26 PROCEDURE — 360N000082 HC SURGERY LEVEL 2 W/ FLUORO, PER MIN: Performed by: INTERNAL MEDICINE

## 2023-09-26 PROCEDURE — 250N000013 HC RX MED GY IP 250 OP 250 PS 637: Performed by: INTERNAL MEDICINE

## 2023-09-26 PROCEDURE — 250N000009 HC RX 250: Performed by: STUDENT IN AN ORGANIZED HEALTH CARE EDUCATION/TRAINING PROGRAM

## 2023-09-26 PROCEDURE — BF131ZZ FLUOROSCOPY OF GALLBLADDER AND BILE DUCTS USING LOW OSMOLAR CONTRAST: ICD-10-PCS | Performed by: INTERNAL MEDICINE

## 2023-09-26 PROCEDURE — 250N000011 HC RX IP 250 OP 636: Mod: JZ | Performed by: SURGERY

## 2023-09-26 PROCEDURE — 250N000011 HC RX IP 250 OP 636: Performed by: STUDENT IN AN ORGANIZED HEALTH CARE EDUCATION/TRAINING PROGRAM

## 2023-09-26 PROCEDURE — 258N000003 HC RX IP 258 OP 636: Performed by: ANESTHESIOLOGY

## 2023-09-26 PROCEDURE — 250N000009 HC RX 250: Performed by: NURSE ANESTHETIST, CERTIFIED REGISTERED

## 2023-09-26 PROCEDURE — C1726 CATH, BAL DIL, NON-VASCULAR: HCPCS | Performed by: INTERNAL MEDICINE

## 2023-09-26 PROCEDURE — 258N000003 HC RX IP 258 OP 636: Performed by: SURGERY

## 2023-09-26 PROCEDURE — 85014 HEMATOCRIT: CPT | Performed by: INTERNAL MEDICINE

## 2023-09-26 PROCEDURE — 80053 COMPREHEN METABOLIC PANEL: CPT | Performed by: SURGERY

## 2023-09-26 PROCEDURE — 370N000017 HC ANESTHESIA TECHNICAL FEE, PER MIN: Performed by: INTERNAL MEDICINE

## 2023-09-26 PROCEDURE — C1769 GUIDE WIRE: HCPCS | Performed by: INTERNAL MEDICINE

## 2023-09-26 RX ORDER — HYDROMORPHONE HYDROCHLORIDE 1 MG/ML
0.4 INJECTION, SOLUTION INTRAMUSCULAR; INTRAVENOUS; SUBCUTANEOUS EVERY 5 MIN PRN
Status: DISCONTINUED | OUTPATIENT
Start: 2023-09-26 | End: 2023-09-26 | Stop reason: HOSPADM

## 2023-09-26 RX ORDER — SODIUM CHLORIDE, SODIUM LACTATE, POTASSIUM CHLORIDE, CALCIUM CHLORIDE 600; 310; 30; 20 MG/100ML; MG/100ML; MG/100ML; MG/100ML
INJECTION, SOLUTION INTRAVENOUS CONTINUOUS
Status: DISCONTINUED | OUTPATIENT
Start: 2023-09-26 | End: 2023-09-26 | Stop reason: HOSPADM

## 2023-09-26 RX ORDER — ONDANSETRON 4 MG/1
4 TABLET, ORALLY DISINTEGRATING ORAL EVERY 30 MIN PRN
Status: DISCONTINUED | OUTPATIENT
Start: 2023-09-26 | End: 2023-09-26 | Stop reason: HOSPADM

## 2023-09-26 RX ORDER — LIDOCAINE 40 MG/G
CREAM TOPICAL
Status: DISCONTINUED | OUTPATIENT
Start: 2023-09-26 | End: 2023-09-27

## 2023-09-26 RX ORDER — DEXAMETHASONE SODIUM PHOSPHATE 10 MG/ML
INJECTION, SOLUTION INTRAMUSCULAR; INTRAVENOUS PRN
Status: DISCONTINUED | OUTPATIENT
Start: 2023-09-26 | End: 2023-09-26

## 2023-09-26 RX ORDER — MEPERIDINE HYDROCHLORIDE 25 MG/ML
12.5 INJECTION INTRAMUSCULAR; INTRAVENOUS; SUBCUTANEOUS EVERY 5 MIN PRN
Status: DISCONTINUED | OUTPATIENT
Start: 2023-09-26 | End: 2023-09-26 | Stop reason: HOSPADM

## 2023-09-26 RX ORDER — FENTANYL CITRATE 50 UG/ML
INJECTION, SOLUTION INTRAMUSCULAR; INTRAVENOUS PRN
Status: DISCONTINUED | OUTPATIENT
Start: 2023-09-26 | End: 2023-09-26

## 2023-09-26 RX ORDER — FENTANYL CITRATE 50 UG/ML
25 INJECTION, SOLUTION INTRAMUSCULAR; INTRAVENOUS EVERY 5 MIN PRN
Status: DISCONTINUED | OUTPATIENT
Start: 2023-09-26 | End: 2023-09-26 | Stop reason: HOSPADM

## 2023-09-26 RX ORDER — LIDOCAINE HYDROCHLORIDE 10 MG/ML
INJECTION, SOLUTION INFILTRATION; PERINEURAL PRN
Status: DISCONTINUED | OUTPATIENT
Start: 2023-09-26 | End: 2023-09-26

## 2023-09-26 RX ORDER — HYDROMORPHONE HYDROCHLORIDE 1 MG/ML
0.2 INJECTION, SOLUTION INTRAMUSCULAR; INTRAVENOUS; SUBCUTANEOUS EVERY 5 MIN PRN
Status: DISCONTINUED | OUTPATIENT
Start: 2023-09-26 | End: 2023-09-26 | Stop reason: HOSPADM

## 2023-09-26 RX ORDER — ONDANSETRON 2 MG/ML
INJECTION INTRAMUSCULAR; INTRAVENOUS PRN
Status: DISCONTINUED | OUTPATIENT
Start: 2023-09-26 | End: 2023-09-26

## 2023-09-26 RX ORDER — FENTANYL CITRATE 50 UG/ML
50 INJECTION, SOLUTION INTRAMUSCULAR; INTRAVENOUS EVERY 5 MIN PRN
Status: DISCONTINUED | OUTPATIENT
Start: 2023-09-26 | End: 2023-09-26 | Stop reason: HOSPADM

## 2023-09-26 RX ORDER — PROPOFOL 10 MG/ML
INJECTION, EMULSION INTRAVENOUS PRN
Status: DISCONTINUED | OUTPATIENT
Start: 2023-09-26 | End: 2023-09-26

## 2023-09-26 RX ORDER — ONDANSETRON 2 MG/ML
4 INJECTION INTRAMUSCULAR; INTRAVENOUS EVERY 30 MIN PRN
Status: DISCONTINUED | OUTPATIENT
Start: 2023-09-26 | End: 2023-09-26 | Stop reason: HOSPADM

## 2023-09-26 RX ORDER — INDOMETHACIN 50 MG/1
100 SUPPOSITORY RECTAL
Status: DISCONTINUED | OUTPATIENT
Start: 2023-09-26 | End: 2023-09-26

## 2023-09-26 RX ORDER — SODIUM CHLORIDE, SODIUM LACTATE, POTASSIUM CHLORIDE, CALCIUM CHLORIDE 600; 310; 30; 20 MG/100ML; MG/100ML; MG/100ML; MG/100ML
INJECTION, SOLUTION INTRAVENOUS CONTINUOUS PRN
Status: DISCONTINUED | OUTPATIENT
Start: 2023-09-26 | End: 2023-09-26

## 2023-09-26 RX ADMIN — METHYLCELLULOSE 1000 MG: 500 TABLET ORAL at 08:36

## 2023-09-26 RX ADMIN — FENTANYL CITRATE 50 MCG: 50 INJECTION, SOLUTION INTRAMUSCULAR; INTRAVENOUS at 12:43

## 2023-09-26 RX ADMIN — SUGAMMADEX 200 MG: 100 INJECTION, SOLUTION INTRAVENOUS at 13:29

## 2023-09-26 RX ADMIN — POTASSIUM CHLORIDE, DEXTROSE MONOHYDRATE AND SODIUM CHLORIDE: 150; 5; 450 INJECTION, SOLUTION INTRAVENOUS at 06:49

## 2023-09-26 RX ADMIN — FENTANYL CITRATE 50 MCG: 50 INJECTION, SOLUTION INTRAMUSCULAR; INTRAVENOUS at 13:02

## 2023-09-26 RX ADMIN — DEXAMETHASONE SODIUM PHOSPHATE 4 MG: 10 INJECTION, SOLUTION INTRAMUSCULAR; INTRAVENOUS at 13:01

## 2023-09-26 RX ADMIN — FAMOTIDINE 20 MG: 20 TABLET ORAL at 20:42

## 2023-09-26 RX ADMIN — PROPOFOL 200 MG: 10 INJECTION, EMULSION INTRAVENOUS at 12:43

## 2023-09-26 RX ADMIN — SODIUM CHLORIDE, POTASSIUM CHLORIDE, SODIUM LACTATE AND CALCIUM CHLORIDE: 600; 310; 30; 20 INJECTION, SOLUTION INTRAVENOUS at 13:51

## 2023-09-26 RX ADMIN — ONDANSETRON 4 MG: 2 INJECTION INTRAMUSCULAR; INTRAVENOUS at 13:01

## 2023-09-26 RX ADMIN — MIDAZOLAM 1 MG: 1 INJECTION INTRAMUSCULAR; INTRAVENOUS at 12:39

## 2023-09-26 RX ADMIN — FLUTICASONE FUROATE 1 PUFF: 100 POWDER RESPIRATORY (INHALATION) at 08:36

## 2023-09-26 RX ADMIN — FAMOTIDINE 20 MG: 20 TABLET ORAL at 08:36

## 2023-09-26 RX ADMIN — FAMOTIDINE 20 MG: 10 INJECTION, SOLUTION INTRAVENOUS at 05:59

## 2023-09-26 RX ADMIN — ROCURONIUM BROMIDE 50 MG: 50 INJECTION, SOLUTION INTRAVENOUS at 12:43

## 2023-09-26 RX ADMIN — MIDAZOLAM 1 MG: 1 INJECTION INTRAMUSCULAR; INTRAVENOUS at 12:34

## 2023-09-26 RX ADMIN — SENNOSIDES AND DOCUSATE SODIUM 1 TABLET: 50; 8.6 TABLET ORAL at 20:42

## 2023-09-26 RX ADMIN — PIPERACILLIN AND TAZOBACTAM 3.38 G: 3; .375 INJECTION, POWDER, LYOPHILIZED, FOR SOLUTION INTRAVENOUS at 20:44

## 2023-09-26 RX ADMIN — ACETAMINOPHEN 975 MG: 325 TABLET ORAL at 20:41

## 2023-09-26 RX ADMIN — SENNOSIDES AND DOCUSATE SODIUM 1 TABLET: 50; 8.6 TABLET ORAL at 08:36

## 2023-09-26 RX ADMIN — ACETAMINOPHEN 975 MG: 325 TABLET ORAL at 05:29

## 2023-09-26 RX ADMIN — PIPERACILLIN AND TAZOBACTAM 3.38 G: 3; .375 INJECTION, POWDER, LYOPHILIZED, FOR SOLUTION INTRAVENOUS at 06:04

## 2023-09-26 RX ADMIN — LIDOCAINE HYDROCHLORIDE 5 ML: 10 INJECTION, SOLUTION INFILTRATION; PERINEURAL at 12:43

## 2023-09-26 RX ADMIN — SODIUM CHLORIDE, POTASSIUM CHLORIDE, SODIUM LACTATE AND CALCIUM CHLORIDE: 600; 310; 30; 20 INJECTION, SOLUTION INTRAVENOUS at 12:25

## 2023-09-26 ASSESSMENT — ACTIVITIES OF DAILY LIVING (ADL)
ADLS_ACUITY_SCORE: 22

## 2023-09-26 NOTE — ANESTHESIA POSTPROCEDURE EVALUATION
Patient: Pedro Chun    Procedure: Procedure(s):  ENDOSCOPIC RETROGRADE CHOLANGIOPANCREATOGRAPHY WITH SPHINCTEROTOMY,  STONE EXTRACTION AND PYLORIC DILATION       Anesthesia Type:  General    Note:  Disposition: Outpatient   Postop Pain Control: Uneventful            Sign Out: Well controlled pain   PONV: No   Neuro/Psych: Uneventful            Sign Out: Acceptable/Baseline neuro status   Airway/Respiratory: Uneventful            Sign Out: Acceptable/Baseline resp. status   CV/Hemodynamics: Uneventful            Sign Out: Acceptable CV status; No obvious hypovolemia; No obvious fluid overload   Other NRE: NONE   DID A NON-ROUTINE EVENT OCCUR? No           Last vitals:  Vitals Value Taken Time   /65 09/26/23 1400   Temp     Pulse 83 09/26/23 1411   Resp 13 09/26/23 1411   SpO2 92 % 09/26/23 1411   Vitals shown include unvalidated device data.    Electronically Signed By: Bull Negron MD  September 26, 2023  2:12 PM

## 2023-09-26 NOTE — ANESTHESIA CARE TRANSFER NOTE
Patient: Pedro Chun    Procedure: Procedure(s):  ENDOSCOPIC RETROGRADE CHOLANGIOPANCREATOGRAPHY WITH SPHINCTEROTOMY,  STONE EXTRACTION AND PYLORIC DILATION       Diagnosis: Choledocholithiasis [K80.50]  Diagnosis Additional Information: No value filed.    Anesthesia Type:   General     Note:    Oropharynx: oropharynx clear of all foreign objects and spontaneously breathing  Level of Consciousness: drowsy  Oxygen Supplementation: face mask  Level of Supplemental Oxygen (L/min / FiO2): 6  Independent Airway: airway patency satisfactory and stable  Dentition: dentition unchanged  Vital Signs Stable: post-procedure vital signs reviewed and stable  Report to RN Given: handoff report given  Patient transferred to: PACU    Handoff Report: Identifed the Patient, Identified the Reponsible Provider, Reviewed the pertinent medical history, Discussed the surgical course, Reviewed Intra-OP anesthesia mangement and issues during anesthesia, Set expectations for post-procedure period and Allowed opportunity for questions and acknowledgement of understanding      Vitals:  Vitals Value Taken Time   /65 09/26/23 1338   Temp 97.4 09/26/23    1338   Pulse 85 09/26/23 1338   Resp 16 09/26/23 1338   SpO2     Vitals shown include unvalidated device data.    Electronically Signed By: JAMES Garvin CRNA  September 26, 2023  1:40 PM

## 2023-09-26 NOTE — ANESTHESIA PROCEDURE NOTES
Airway       Patient location during procedure: OR       Procedure Start/Stop Times: 9/26/2023 12:47 PM  Staff -        Anesthesiologist:  Bull Negron MD       CRNA: Myara Buchanan APRN CRNA       Performed By: CRNA  Consent for Airway        Urgency: elective  Indications and Patient Condition       Indications for airway management: shonda-procedural       Induction type:intravenous       Mask difficulty assessment: 1 - vent by mask    Final Airway Details       Final airway type: endotracheal airway       Successful airway: ETT - single  Endotracheal Airway Details        ETT size (mm): 7.5       Cuffed: yes       Successful intubation technique: direct laryngoscopy       DL Blade Type: MAC 4       Grade View of Cords: 1       Adjucts: stylet       Position: Right       Measured from: gums/teeth       Secured at (cm): 22       Bite block used: None    Post intubation assessment        Placement verified by: capnometry, equal breath sounds and chest rise        Number of attempts at approach: 1       Number of other approaches attempted: 0       Secured with: silk tape       Ease of procedure: easy       Dentition: Intact and Unchanged       Dental guard used and removed.    Medication(s) Administered   Medication Administration Time: 9/26/2023 12:47 PM

## 2023-09-26 NOTE — PROGRESS NOTES
"[facility]    Medicine Progress Note - Hospitalist Service    Date of Admission:  9/24/2023    Assessment & Plan   67year-old male with medical history significant for lupus anticoagulant positive status, hx of prior PE(4 years ago), hypertension, hyperlipidemia, hearing loss, comes in with epigastric and right upper quadrant pain concerning for acute cholecystitis           Cholecystitis choledocholithiasis  --- Postop day 1, status post lap mayra  ---still on zosyn  ---now on clear liquids  ---decrease IVF  ---lft improving, monitir  ---monitor wbc  --- Today status post ERCP with following recommendations;   [facility] to [facility] care.   Avoid aspirin and nonsteroidal anti-inflammatory medicines for 10 days.   Observe patient's clinical course following today's ERCP with therapeutic intervention.    Hold Xarelto for 48-72 hrs       Hypertension-well controlled. On losartan holding with lower bp this am     History of lupus anticoagulant/PE-   ---On DOAC; has been holding since prior to day of discharge.  Continue to hold 48 to 72 hours per above.      Asthma- no exacerbation. Has inhalers     Hypokalemia/hypomag-replace               Diet: Advance Diet as Tolerated: Full Liquid Diet    DVT Prophylaxis: per surgery team holding  Guadalupe Catheter: Not present  Lines: None     Cardiac Monitoring: None  Code Status: Full Code      Clinically Significant Risk Factors        # Hypokalemia: Lowest K = 3.3 mmol/L in last 2 days, will replace as needed     # Hypomagnesemia: Lowest Mg = 1.6 mg/dL in last 2 days, will replace as needed              # Overweight: Estimated body mass index is 27.97 kg/m  as calculated from the following:    Height as of this encounter: 1.854 m (6' 1\").    Weight as of this encounter: 96.2 kg (212 lb)., PRESENT ON ADMISSION            Disposition Plan      Expected Discharge Date: 09/27/2023    Discharge Delays: Procedure Pending (enter procedure & " time in comments)              Delaney Park MD  Hospitalist Service  [facility]  Securely message with [email or web address] (more info)  Text page via [employer] Paging/Directory   ______________________________________________________________________    Interval History   --- Seen with friend after ERCP  --- Mild discomfort in abdomen but no significant pain  --- No vomiting.  -- Has been up ambulating without nausea    Physical Exam   Vital Signs: Temp: 98.8  F (37.1  C) Temp src: Oral BP: 114/59 Pulse: 82   Resp: 18 SpO2: 94 % O2 Device: None (Room air) Oxygen Delivery: 3 LPM  Weight: 212 lbs 0 oz    General Appearance: Pleasant male no apparent distress  Respiratory: Clear to auscultation bilaterally  Cardiovascular: Regular rate and rhythm  GI: Incisions clean dressed and intact with mild diffuse tenderness  Skin: No significant lower extremity edema  Other: Neurologically grossly intact without focal deficits appreciated    Medical Decision Making             Data     I have personally reviewed the following data over the past 24 hrs:    16.8 (H)  \   13.8   / 228     136 100 16.5 /  148 (H)   3.6 26 0.86 \     ALT: 119 (H) AST: 89 (H) AP: 255 (H) TBILI: 3.8 (H)   ALB: 3.6 TOT PROTEIN: 6.8 LIPASE: N/A       Imaging results reviewed over the past 24 hrs:   Recent Results (from the past 24 hour(s))   XR Surgery GAVIN  Fluoro G/T 5 Min    Narrative    This exam was marked as non-reportable because it will not be read by a   radiologist or a [facility] non-radiologist provider.

## 2023-09-26 NOTE — PLAN OF CARE
Goal Outcome Evaluation:  Pt is alert and oriented x4. Pleasant and tolerating pain at 3/10 not asking any pain meds. VSS. Ambulating to bathroom with standby assist due to continuous drips of NaCl 75 ml/hr and D5%+Kcl 20 meq at 100 ml/hr. IV came off and replaced after skin area for IV was shaved.  NPO maintained.       Problem: Plan of Care - These are the overarching goals to be used throughout the patient stay.    Goal: Plan of Care Review  Description: The Plan of Care Review/Shift note should be completed every shift.  The Outcome Evaluation is a brief statement about your assessment that the patient is improving, declining, or no change.  This information will be displayed automatically on your shift note.  Outcome: Progressing     Problem: Pain Acute  Goal: Optimal Pain Control and Function  9/26/2023 0745 by Lizbeth Sarabia RN  Outcome: Progressing  9/26/2023 0744 by Lizbeth Sarabia RN  Outcome: Progressing  Intervention: Prevent or Manage Pain  Recent Flowsheet Documentation  Taken 9/26/2023 0024 by Lizbeth Sarabia RN  Medication Review/Management: medications reviewed     Problem: Gas Exchange Impaired  Goal: Optimal Gas Exchange  9/26/2023 0745 by Lizbeth Sarabia RN  Outcome: Progressing  9/26/2023 0744 by Lizbeth Sarabai RN  Outcome: Progressing  Intervention: Optimize Oxygenation and Ventilation  Recent Flowsheet Documentation  Taken 9/26/2023 0024 by Lizbeth Sarabia RN  Head of Bed (HOB) Positioning: HOB at 20 degrees

## 2023-09-26 NOTE — ANESTHESIA PREPROCEDURE EVALUATION
Anesthesia Pre-Procedure Evaluation    Patient: Pedro Chun   MRN: [id number] : 1955        Procedure : Procedure(s):  ENDOSCOPIC RETROGRADE CHOLANGIOPANCREATOGRAPHY (Mouth)           Past Medical History:   Diagnosis Date    Hypertension       Past Surgical History:   Procedure Laterality Date    CHOLANGIOGRAM N/A 2023    Procedure: CHOLANGIOGRAMS;  Surgeon: Suleiman Griffith MD;  Location: [facility] [place] OR    LAPAROSCOPIC CHOLECYSTECTOMY N/A 2023    Procedure: CHOLECYSTECTOMY, LAPAROSCOPIC;  Surgeon: Suleiman Griffith MD;  Location: [facility] [place] OR      Allergies   Allergen Reactions    Betadine [Povidone Iodine] Rash      Social History     Tobacco Use    Smoking status: Never    Smokeless tobacco: Never   Substance Use Topics    Alcohol use: Yes     Comment: very rare      Wt Readings from Last 1 Encounters:   23 96.2 kg (212 lb)        Anesthesia Evaluation   Pt has had prior anesthetic. Type: General.    No history of anesthetic complications       ROS/MED HX  ENT/Pulmonary:  - neg pulmonary ROS     Neurologic:  - neg neurologic ROS     Cardiovascular:     (+)  hypertension- -   -  - -                                      METS/Exercise Tolerance: >4 METS    Hematologic:  - neg hematologic  ROS     Musculoskeletal:  - neg musculoskeletal ROS     GI/Hepatic:  - neg GI/hepatic ROS   (+)          cholecystitis/cholelithiasis (POD #1 s/p lap mayra, now s/f ERCP today for choledocholithiasis),       (-) GERD   Renal/Genitourinary:     (+) renal disease,             Endo:  - neg endo ROS     Psychiatric/Substance Use:  - neg psychiatric ROS     Infectious Disease:       Malignancy:       Other:            Physical Exam    Airway        Mallampati: III   TM distance: > 3 FB   Neck ROM: full     Respiratory Devices and Support         Dental       (+) Modest Abnormalities - crowns, retainers, 1 or 2 missing teeth      Cardiovascular             Pulmonary           breath  sounds clear to auscultation       Other findings: Hoarse voice    OUTSIDE LABS:  CBC:   Lab Results   Component Value Date    WBC 16.8 (H) 09/26/2023    WBC 14.1 (H) 09/25/2023    HGB 13.8 09/26/2023    HGB 14.0 09/25/2023    HCT 42.8 09/26/2023    HCT 41.9 09/25/2023     09/26/2023     09/25/2023     BMP:   Lab Results   Component Value Date     09/26/2023     09/25/2023    POTASSIUM 3.6 09/26/2023    POTASSIUM 3.4 09/25/2023    CHLORIDE 100 09/26/2023    CHLORIDE 99 09/25/2023    CO2 26 09/26/2023    CO2 25 09/25/2023    BUN 16.5 09/26/2023    BUN 18.3 09/25/2023    CR 0.86 09/26/2023    CR 0.95 09/25/2023     (H) 09/26/2023     (H) 09/25/2023     COAGS: No results found for: PTT, INR, FIBR  POC: No results found for: BGM, HCG, HCGS  HEPATIC:   Lab Results   Component Value Date    ALBUMIN 3.6 09/26/2023    PROTTOTAL 6.8 09/26/2023     (H) 09/26/2023    AST 89 (H) 09/26/2023    ALKPHOS 255 (H) 09/26/2023    BILITOTAL 3.8 (H) 09/26/2023     OTHER:   Lab Results   Component Value Date    LACT 0.8 09/24/2023    GABRIELLA 8.6 (L) 09/26/2023    MAG 2.3 09/26/2023    LIPASE 43 09/24/2023       Anesthesia Plan    ASA Status:  2    NPO Status:  NPO Appropriate    Anesthesia Type: General.     - Airway: ETT   Induction: Intravenous, Propofol.   Maintenance: Balanced.        Consents    Anesthesia Plan(s) and associated risks, benefits, and realistic alternatives discussed. Questions answered and patient/representative(s) expressed understanding.     - Discussed:     - Discussed with:  Patient            Postoperative Care    Pain management: Multi-modal analgesia.   PONV prophylaxis: Ondansetron (or other 5HT-3), Dexamethasone or Solumedrol     Comments:    Other Comments: GETA - well lubricated 7.0 ETT  Decadron 10, zofran 4              sIa Arajuo MD

## 2023-09-26 NOTE — CONSULTS
"[facility] Digestive Health Consultation     Pedro Chun  [address]  [place] [facility] [address]  67 year old male     Admission Date/Time: 9/24/2023  Primary Care Provider: Phoenix Bunch  Referring / Attending Physician:  Vivian     We were asked to see the patient in consultation by Dr. Dueñas for evaluation of \"acute cholecystitis.\"       CC: RUQ pain     HPI:  Pedro Chun is a 67 year old male with PMH HTN, HLD, prior PE, lupus, hearing loss, asthma who presented to ER 9/24 for chest/upper abdominal pain. He had a covid vaccine 9/22 with joint pain and fatigue into the next day. He called EMS for chest/abdominal pain that worsened on 9/24. He did not have any nausea or vomiting. He had lap mayra yesterday and states he has not had any \"surges\" of pain like he was having since pre-surgery. He has post-surgical pain at this time, no nausea/vomiting.      ROS: A comprehensive ten point review of systems was negative aside from those in mentioned in the HPI.      PAST MEDICAL HISTORY:  Patient Active Problem List    Diagnosis Date Noted    Choledocholithiasis 09/25/2023     Priority: Medium    Acute cholecystitis 09/24/2023     Priority: Medium    Hydronephrosis of left kidney      Priority: Medium    Leukocytosis, unspecified type      Priority: Medium    Acute pulmonary embolism (H) 01/08/2019     Priority: Medium    Pulmonary embolism on left (H) 01/08/2019     Priority: Medium     SOCIAL HISTORY:  Social History     Tobacco Use    Smoking status: Never    Smokeless tobacco: Never   Vaping Use    Vaping Use: Never used   Substance Use Topics    Alcohol use: Yes     Comment: very rare    Drug use: No   No tobacco, rare etoh.     FAMILY HISTORY:  History reviewed. No pertinent family history.  No pertinent family history.     ALLERGIES:   Allergies   Allergen Reactions    Betadine [Povidone Iodine] Rash     MEDICATIONS:   Current Facility-Administered Medications   Medication    [START ON 9/28/2023] " "acetaminophen (TYLENOL) tablet 650 mg    acetaminophen (TYLENOL) tablet 975 mg    albuterol (PROVENTIL HFA/VENTOLIN HFA) inhaler    bisacodyl (DULCOLAX) suppository 10 mg    dextrose 5% and 0.45% NaCl + KCl 20 mEq/L infusion    famotidine (PEPCID) tablet 20 mg    Or    famotidine (PEPCID) injection 20 mg    famotidine (PEPCID) injection 20 mg    fluticasone (ARNUITY ELLIPTA) 100 MCG/ACT inhaler 1 puff    HYDROmorphone (DILAUDID) injection 0.2 mg    Or    HYDROmorphone (PF) (DILAUDID) injection 0.5 mg    HYDROmorphone (PF) (DILAUDID) injection 0.5 mg    hydrOXYzine (ATARAX) tablet 10 mg    lidocaine (LMX4) cream    lidocaine 1 % 0.1-1 mL    magnesium hydroxide (MILK OF MAGNESIA) suspension 30 mL    melatonin tablet 1 mg    melatonin tablet 1 mg    methylcellulose (CITRUCEL) tablet 1,000 mg    naloxone (NARCAN) injection 0.2 mg    Or    naloxone (NARCAN) injection 0.4 mg    Or    naloxone (NARCAN) injection 0.2 mg    Or    naloxone (NARCAN) injection 0.4 mg    ondansetron (ZOFRAN ODT) ODT tab 4 mg    Or    ondansetron (ZOFRAN) injection 4 mg    ondansetron (ZOFRAN ODT) ODT tab 4 mg    Or    ondansetron (ZOFRAN) injection 4 mg    ondansetron (ZOFRAN ODT) ODT tab 4 mg    Or    ondansetron (ZOFRAN) injection 4 mg    oxyCODONE (ROXICODONE) tablet 5 mg    Or    oxyCODONE (ROXICODONE) tablet 10 mg    piperacillin-tazobactam (ZOSYN) 3.375 g vial to attach to  mL bag    polyethylene glycol (MIRALAX) Packet 17 g    prochlorperazine (COMPAZINE) injection 5 mg    Or    prochlorperazine (COMPAZINE) tablet 5 mg    senna-docusate (SENOKOT-S/PERICOLACE) 8.6-50 MG per tablet 1 tablet    sodium chloride (PF) 0.9% PF flush 3 mL    sodium chloride (PF) 0.9% PF flush 3 mL    sodium chloride 0.9% infusion     PHYSICAL EXAM:   /59 (BP Location: Left arm)   Pulse 82   Temp 98.8  F (37.1  C) (Oral)   Resp 18   Ht 1.854 m (6' 1\")   Wt 96.2 kg (212 lb)   SpO2 94%   BMI 27.97 kg/m     GEN: NAD, male appears stated age sitting " up in bed  HEENT: No icterus, no lymphadenopathy  HRT: RRR  LUNGS: CTA  ABD: +BS, soft, mild diffuse tenderness worse at epigastric lap site  SKIN: No rash, jaundice  MSKL: no LE edema, strength 5/5 all 4 extrems  NEURO: Alert and oriented, appropriate mood and affect     ADDITIONAL DATA:   I reviewed the patient's new clinical lab test results.   Recent Labs   Lab Test 09/26/23  0510 09/25/23  0233 09/24/23 2009   WBC 16.8* 14.1* 15.8*   HGB 13.8 14.0 14.8   MCV 86 85 84    235 270     Recent Labs   Lab Test 09/26/23  0510 09/25/23  0233 09/24/23 2009   POTASSIUM 3.6 3.4 3.3*   CHLORIDE 100 99 98   CO2 26 25 24   BUN 16.5 18.3 18.8   ANIONGAP 10 12 14     Recent Labs   Lab Test 09/26/23  0510 09/25/23  0233 09/24/23 2009   ALBUMIN 3.6 3.9 4.1   BILITOTAL 3.8* 2.7* 1.0   * 146* 37   AST 89* 182* 47*   LIPASE  --   --  43        Imaging results:  RUQ ultrasound 9/24/23:  FINDINGS:  GALLBLADDER: Sludge and stone material in the gallbladder. Gallbladder wall is thickened and edematous at 7 mm. Sonographic Osman's sign is negative.  BILE DUCTS: Common bile duct is dilated up to 1.2 cm. Distal common bile duct is obscured by overlying bowel gas.  LIVER: Normal parenchyma with smooth contour. No focal mass.  RIGHT KIDNEY: No hydronephrosis.  PANCREAS: Obscured by overlying bowel gas.  No ascites.                                                                   IMPRESSION:  1.  Cholelithiasis with gallbladder wall thickening. Despite a negative sonographic Osman's sign, findings would be suspicious for cholecystitis in the appropriate setting. Clinical correlation.  2.  Common bile duct is dilated up to 1.2 cm. This would be concerning for potential distal common bile duct stone or obstructing process. MRCP suggested in further evaluation.  3.  Pancreas obscured by overlying bowel gas. Remainder of the exam is unremarkable.    CXR 9/24/23:  IMPRESSION: The heart is normal in size. Left basilar discoid  atelectasis is present.           ASSESSMENT:    Choledocholithiasis  Acute cholecystitis  This is a 66 y/o male with PMH HTN, HLD, prior PE, lupus, hearing loss, asthma admitted 9/24 for acute cholecystitis after presenting with RUQ pain. US showed cholelithiasis with gallbladder wall thickening and CBD dilation to 1.2 cm. Liver tests elevated concerning for biliary obstruction. He underwent lap mayra 9/25 with positive IOC so he will need ERCP for likely choledocholithiasis. Liver tests further elevated today, WBC up to 16.8 today, Tmax 100.6 9/25, pt on Zosyn for possible cholangitis. Current pain c/w post-mayra state.     PLAN:  - NPO  - ERCP today with Dr. Nugent, tentatively at 1:45 PM  - Continue abx  - Supportive cares per medicine        Yesenia Wills PA-C  [facility]  9/26/2023 8:07 AM  [phone] (office)    This case was discussed with Dr. ANJUM Irving who agrees to the above assessment and plan.    50 minutes of total time was spent today providing patient care, including patient evaluation, reviewing documentation/test result, and .   ________________________________________________________________________

## 2023-09-26 NOTE — PLAN OF CARE
"/64   Pulse 82   Temp 97.3  F (36.3  C)   Resp 11   Ht 1.854 m (6' 1\")   Wt 96.2 kg (212 lb)   SpO2 93%   BMI 27.97 kg/m      Patient at ERCP for most of day shift. A/Ox4, no pain. Patient will return to unit shortly from PACU.  "

## 2023-09-26 NOTE — PLAN OF CARE
"  Problem: Plan of Care - These are the overarching goals to be used throughout the patient stay.    Goal: Plan of Care Review  Description: The Plan of Care Review/Shift note should be completed every shift.  The Outcome Evaluation is a brief statement about your assessment that the patient is improving, declining, or no change.  This information will be displayed automatically on your shift note.  9/25/2023 2315 by Jaz Ferrell RN  Outcome: Progressing  9/25/2023 1902 by Jaz Ferrell RN  Outcome: Progressing  Goal: Patient-Specific Goal (Individualized)  Description: You can add care plan individualizations to a care plan. Examples of Individualization might be:  \"Parent requests to be called daily at 9am for status\", \"I have a hard time hearing out of my right ear\", or \"Do not touch me to wake me up as it startles me\".  9/25/2023 2315 by Jaz Ferrell RN  Outcome: Progressing  9/25/2023 1902 by Jaz Ferrell RN  Outcome: Progressing  Goal: Absence of Hospital-Acquired Illness or Injury  9/25/2023 2315 by Jaz Ferrell RN  Outcome: Progressing  9/25/2023 1902 by Jaz Ferrell RN  Outcome: Progressing  Intervention: Identify and Manage Fall Risk  Recent Flowsheet Documentation  Taken 9/25/2023 1900 by Jaz Ferrell RN  Safety Promotion/Fall Prevention: assistive device/personal items within reach  Goal: Optimal Comfort and Wellbeing  9/25/2023 2315 by Jaz Ferrell RN  Outcome: Progressing  9/25/2023 1902 by Jaz Ferrell RN  Outcome: Progressing  Goal: Readiness for Transition of Care  9/25/2023 2315 by Jaz Ferrell RN  Outcome: Progressing  9/25/2023 1902 by Jaz Ferrell RN  Outcome: Progressing     Problem: Pain Acute  Goal: Optimal Pain Control and Function  9/25/2023 2315 by Jaz Ferrell RN  Outcome: Progressing  9/25/2023 1902 by Jaz Ferrell RN  Outcome: Progressing  Intervention: Prevent or Manage Pain  Recent Flowsheet " Documentation  Taken 9/25/2023 1900 by Jza Ferrell RN  Medication Review/Management: medications reviewed     Problem: Gas Exchange Impaired  Goal: Optimal Gas Exchange  9/25/2023 2315 by Jaz Ferrell RN  Outcome: Progressing  9/25/2023 1902 by Jaz Ferrell RN  Outcome: Progressing   Goal Outcome Evaluation:         Pt is alert and oriented x4, pt complained of pain on his abdomen on the incision, 5 mg oxycodone was administered. Went to the bathroom once and voided.

## 2023-09-27 VITALS
TEMPERATURE: 99.1 F | SYSTOLIC BLOOD PRESSURE: 133 MMHG | HEIGHT: 73 IN | WEIGHT: 212 LBS | HEART RATE: 80 BPM | BODY MASS INDEX: 28.1 KG/M2 | RESPIRATION RATE: 18 BRPM | OXYGEN SATURATION: 94 % | DIASTOLIC BLOOD PRESSURE: 76 MMHG

## 2023-09-27 LAB
ALBUMIN SERPL BCG-MCNC: 3.3 G/DL (ref 3.5–5.2)
ALP SERPL-CCNC: 184 U/L (ref 40–129)
ALT SERPL W P-5'-P-CCNC: 68 U/L (ref 0–70)
ANION GAP SERPL CALCULATED.3IONS-SCNC: 10 MMOL/L (ref 7–15)
AST SERPL W P-5'-P-CCNC: 41 U/L (ref 0–45)
BILIRUB SERPL-MCNC: 1.2 MG/DL
BUN SERPL-MCNC: 14.5 MG/DL (ref 8–23)
CALCIUM SERPL-MCNC: 8.8 MG/DL (ref 8.8–10.2)
CHLORIDE SERPL-SCNC: 103 MMOL/L (ref 98–107)
CREAT SERPL-MCNC: 0.76 MG/DL (ref 0.67–1.17)
DEPRECATED HCO3 PLAS-SCNC: 25 MMOL/L (ref 22–29)
EGFRCR SERPLBLD CKD-EPI 2021: >90 ML/MIN/1.73M2
ERYTHROCYTE [DISTWIDTH] IN BLOOD BY AUTOMATED COUNT: 14.2 % (ref 10–15)
GLUCOSE BLDC GLUCOMTR-MCNC: 112 MG/DL (ref 70–99)
GLUCOSE BLDC GLUCOMTR-MCNC: 154 MG/DL (ref 70–99)
GLUCOSE BLDC GLUCOMTR-MCNC: 53 MG/DL (ref 70–99)
GLUCOSE SERPL-MCNC: 113 MG/DL (ref 70–99)
HCT VFR BLD AUTO: 36.7 % (ref 40–53)
HGB BLD-MCNC: 12.2 G/DL (ref 13.3–17.7)
LIPASE SERPL-CCNC: 53 U/L (ref 13–60)
MCH RBC QN AUTO: 28.3 PG (ref 26.5–33)
MCHC RBC AUTO-ENTMCNC: 33.2 G/DL (ref 31.5–36.5)
MCV RBC AUTO: 85 FL (ref 78–100)
PLATELET # BLD AUTO: 227 10E3/UL (ref 150–450)
POTASSIUM SERPL-SCNC: 3.5 MMOL/L (ref 3.4–5.3)
PROT SERPL-MCNC: 6.4 G/DL (ref 6.4–8.3)
RBC # BLD AUTO: 4.31 10E6/UL (ref 4.4–5.9)
SODIUM SERPL-SCNC: 138 MMOL/L (ref 135–145)
WBC # BLD AUTO: 14.1 10E3/UL (ref 4–11)

## 2023-09-27 PROCEDURE — 80053 COMPREHEN METABOLIC PANEL: CPT | Performed by: FAMILY MEDICINE

## 2023-09-27 PROCEDURE — 83690 ASSAY OF LIPASE: CPT | Performed by: SURGERY

## 2023-09-27 PROCEDURE — 250N000013 HC RX MED GY IP 250 OP 250 PS 637: Performed by: SURGERY

## 2023-09-27 PROCEDURE — 85027 COMPLETE CBC AUTOMATED: CPT | Performed by: FAMILY MEDICINE

## 2023-09-27 PROCEDURE — 99024 POSTOP FOLLOW-UP VISIT: CPT | Performed by: PHYSICIAN ASSISTANT

## 2023-09-27 PROCEDURE — 250N000011 HC RX IP 250 OP 636: Mod: JZ | Performed by: SURGERY

## 2023-09-27 PROCEDURE — 258N000003 HC RX IP 258 OP 636: Performed by: FAMILY MEDICINE

## 2023-09-27 PROCEDURE — 36415 COLL VENOUS BLD VENIPUNCTURE: CPT | Performed by: FAMILY MEDICINE

## 2023-09-27 PROCEDURE — 99239 HOSP IP/OBS DSCHRG MGMT >30: CPT | Performed by: FAMILY MEDICINE

## 2023-09-27 RX ORDER — POLYETHYLENE GLYCOL 3350 17 G/17G
17 POWDER, FOR SOLUTION ORAL DAILY
COMMUNITY
Start: 2023-09-28 | End: 2024-08-08

## 2023-09-27 RX ORDER — CHLORTHALIDONE 25 MG/1
12.5 TABLET ORAL EVERY MORNING
Start: 2023-09-27

## 2023-09-27 RX ORDER — AMOXICILLIN 250 MG
1 CAPSULE ORAL 2 TIMES DAILY PRN
Status: CANCELLED | COMMUNITY
Start: 2023-09-27

## 2023-09-27 RX ORDER — RIVAROXABAN 20 MG/1
20 TABLET, FILM COATED ORAL EVERY MORNING
Start: 2023-09-29

## 2023-09-27 RX ORDER — OXYCODONE HYDROCHLORIDE 5 MG/1
5-10 TABLET ORAL EVERY 6 HOURS PRN
Qty: 12 TABLET | Refills: 0 | OUTPATIENT
Start: 2023-09-27 | End: 2024-08-08

## 2023-09-27 RX ORDER — ACETAMINOPHEN 325 MG/1
975 TABLET ORAL EVERY 8 HOURS
Status: CANCELLED | COMMUNITY
Start: 2023-09-27

## 2023-09-27 RX ADMIN — PIPERACILLIN AND TAZOBACTAM 3.38 G: 3; .375 INJECTION, POWDER, LYOPHILIZED, FOR SOLUTION INTRAVENOUS at 05:38

## 2023-09-27 RX ADMIN — ACETAMINOPHEN 975 MG: 325 TABLET ORAL at 12:01

## 2023-09-27 RX ADMIN — FLUTICASONE FUROATE 1 PUFF: 100 POWDER RESPIRATORY (INHALATION) at 08:17

## 2023-09-27 RX ADMIN — POLYETHYLENE GLYCOL 3350 17 G: 17 POWDER, FOR SOLUTION ORAL at 08:16

## 2023-09-27 RX ADMIN — FAMOTIDINE 20 MG: 20 TABLET ORAL at 08:16

## 2023-09-27 RX ADMIN — SENNOSIDES AND DOCUSATE SODIUM 1 TABLET: 50; 8.6 TABLET ORAL at 08:16

## 2023-09-27 RX ADMIN — ACETAMINOPHEN 975 MG: 325 TABLET ORAL at 05:37

## 2023-09-27 RX ADMIN — METHYLCELLULOSE 1000 MG: 500 TABLET ORAL at 08:16

## 2023-09-27 RX ADMIN — POTASSIUM CHLORIDE, DEXTROSE MONOHYDRATE AND SODIUM CHLORIDE: 150; 5; 450 INJECTION, SOLUTION INTRAVENOUS at 03:06

## 2023-09-27 ASSESSMENT — ACTIVITIES OF DAILY LIVING (ADL)
ADLS_ACUITY_SCORE: 22

## 2023-09-27 NOTE — PROGRESS NOTES
Discharge paperwork discussed with patient. Questions answered and encouraged. Belongings sent with patient. Patient escorted to main entrance where friend is transporting home.

## 2023-09-27 NOTE — DISCHARGE INSTRUCTIONS
From your Surgeon:    Follow up:  For straightforward laparoscopic procedures, our practice is to check-in with you over the phone a few days after your procedure.  If you would like a scheduled follow up appointment in clinic, please call us at [phone] to schedule an appointment at your convenience.  If you would prefer to follow up with us further by phone, please let us know so that we may contact you 2-3 weeks following your procedure.        Diet: Regular diet. Patients can have difficulty with constipation following surgery, due in part to the administration of narcotic medications.  If you are suffering with constipation, you should avoid foods such as hard cheeses or red meat.  Foods high in fiber are recommended.      Activity: You should continue to be active at home, including ambulating frequently.  If possible try to limit the amount of time spent in bed.    Restrictions: You should not lift greater than 20 pounds for 2 weeks, and will want to avoid strenuous physical activity for 1-2 weeks.  You should limit your physical activity if it causes you discomfort; however, this should resolve within 1-2 weeks.   Walking does not count as strenuous physical activity.  You are safe to walk up and down stairs.  Following 2 weeks you may resume all normal physical activity.    Work:  You can return to work once your surgical pain has resolved.  If you perform duties that require lifting, pushing or pulling anything greater than 15 pounds, then you would have to be on light duty for the immediate 2 weeks after your surgery (if your work allows light duty).  After 2 weeks, you can return to work without restrictions.    Wound care: No dressing over your incisions needed. It is normal to have a small rim of red present around the incisions. This should not, however, extend beyond 1/4 inch from the incision.  If your incisions become increasingly tender, red, or draining, please contact us.       Bathing: You  may shower after 24 hours from surgery.  It is ok to get your incisions wet, but avoid rubbing them.  Avoid soaking in bath tubs, or swimming in lakes, pools, or streams for 2 weeks following surgery.

## 2023-09-27 NOTE — PLAN OF CARE
"  Problem: Plan of Care - These are the overarching goals to be used throughout the patient stay.    Goal: Plan of Care Review  Description: The Plan of Care Review/Shift note should be completed every shift.  The Outcome Evaluation is a brief statement about your assessment that the patient is improving, declining, or no change.  This information will be displayed automatically on your shift note.  Outcome: Progressing  Goal: Patient-Specific Goal (Individualized)  Description: You can add care plan individualizations to a care plan. Examples of Individualization might be:  \"Parent requests to be called daily at 9am for status\", \"I have a hard time hearing out of my right ear\", or \"Do not touch me to wake me up as it startles me\".  Outcome: Progressing  Goal: Absence of Hospital-Acquired Illness or Injury  Outcome: Progressing  Intervention: Identify and Manage Fall Risk  Recent Flowsheet Documentation  Taken 9/26/2023 1612 by Jaz Ferrell RN  Safety Promotion/Fall Prevention: assistive device/personal items within reach  Intervention: Prevent and Manage VTE (Venous Thromboembolism) Risk  Recent Flowsheet Documentation  Taken 9/26/2023 1612 by Jaz Ferrell RN  VTE Prevention/Management: SCDs (sequential compression devices) on  Goal: Optimal Comfort and Wellbeing  Outcome: Progressing  Goal: Readiness for Transition of Care  Outcome: Progressing     Problem: Pain Acute  Goal: Optimal Pain Control and Function  Outcome: Progressing  Intervention: Prevent or Manage Pain  Recent Flowsheet Documentation  Taken 9/26/2023 1612 by Jaz Ferrell RN  Medication Review/Management: medications reviewed     Problem: Gas Exchange Impaired  Goal: Optimal Gas Exchange  Outcome: Progressing   Goal Outcome Evaluation:         Pt is alert and oriented x 4, vitals are stable. Ambulating to the bathroom couple times with stand by assist. Denies pain, except uncomfortable at the insertion site when he moves. No " voiced concern at this moment.

## 2023-09-27 NOTE — PROGRESS NOTES
ASSESSMENT:  1. Choledocholithiasis    2. Acute cholecystitis        Pedro Chun is a 67 year old male who is s/p laparoscopic cholecystectomy with IOC on 9/25/23. Concern for possible choledocholithiasis on IOC, GI following and patient underwent ERCP on 9/26 and choledocholithiasis was confirmed. Patient otherwise doing well post-operatively. Pain adequately controlled and passing flatus.     PLAN:  - Diet per GI  - Home xarelto resumption per GI  - No further antibiotics per surgery standpoint  - Okay for discharge from surgical standpoint when deemed medically appropriate  - Discharge recommendations and follow-up instructions placed in AVS    SUBJECTIVE:   He is doing well. Pain adequately controlled. Denies nausea, vomiting. Tolerating clear liquid diet without issues. Voiding and ambulating.       Patient Vitals for the past 24 hrs:   BP Temp Temp src Pulse Resp SpO2   09/27/23 0734 133/76 99.1  F (37.3  C) Oral 80 18 94 %   09/27/23 0034 127/72 97.4  F (36.3  C) Oral 72 17 94 %   09/26/23 2007 127/77 98.3  F (36.8  C) Oral 79 18 98 %   09/26/23 1541 -- 97.9  F (36.6  C) Oral 84 16 93 %   09/26/23 1429 -- -- -- -- -- 96 %   09/26/23 1415 131/64 -- -- 82 11 93 %   09/26/23 1400 131/65 -- -- 84 16 95 %   09/26/23 1345 131/63 -- -- 82 13 100 %   09/26/23 1337 (!) 144/65 97.3  F (36.3  C) -- -- -- --   09/26/23 1021 123/70 98  F (36.7  C) Oral 91 16 95 %         PHYSICAL EXAM:  General: patient seen resting in bed, no acute distress  Resp: no respiratory distress, breathing comfortably on room air  Abdomen: Soft, non-tender, non-distended. Laparoscopic incisions clean/dry/intact.  Extremities: warm and well perfused    09/26 0700 - 09/27 0659  In: 1340 [P.O.:440; I.V.:900]  Out: -     Admission on 09/24/2023   Component Date Value    Hold Specimen 09/24/2023 [facility]     Hold Specimen 09/24/2023 [facility]     Hold Specimen 09/24/2023 [facility]     Hold Specimen 09/24/2023 [facility]     Sodium 09/24/2023 136     Potassium  09/24/2023 3.3 (L)     Chloride 09/24/2023 98     Carbon Dioxide (CO2) 09/24/2023 24     Anion Gap 09/24/2023 14     Urea Nitrogen 09/24/2023 18.8     Creatinine 09/24/2023 0.92     Calcium 09/24/2023 9.2     Glucose 09/24/2023 129 (H)     GFR Estimate 09/24/2023 >90     Protein Total 09/24/2023 7.3     Albumin 09/24/2023 4.1     Bilirubin Total 09/24/2023 1.0     Alkaline Phosphatase 09/24/2023 176 (H)     AST 09/24/2023 47 (H)     ALT 09/24/2023 37     Bilirubin Direct 09/24/2023 0.56 (H)     Lipase 09/24/2023 43     Lactic Acid 09/24/2023 0.8     Troponin T, High Sensiti* 09/24/2023 12     Magnesium 09/24/2023 1.6 (L)     WBC Count 09/24/2023 15.8 (H)     RBC Count 09/24/2023 5.28     Hemoglobin 09/24/2023 14.8     Hematocrit 09/24/2023 44.4     MCV 09/24/2023 84     MCH 09/24/2023 28.0     MCHC 09/24/2023 33.3     RDW 09/24/2023 13.8     Platelet Count 09/24/2023 270     % Neutrophils 09/24/2023 73     % Lymphocytes 09/24/2023 13     % Monocytes 09/24/2023 12     % Eosinophils 09/24/2023 2     % Basophils 09/24/2023 0     % Immature Granulocytes 09/24/2023 0     NRBCs per 100 WBC 09/24/2023 0     Absolute Neutrophils 09/24/2023 11.5 (H)     Absolute Lymphocytes 09/24/2023 2.0     Absolute Monocytes 09/24/2023 1.9 (H)     Absolute Eosinophils 09/24/2023 0.3     Absolute Basophils 09/24/2023 0.0     Absolute Immature Granul* 09/24/2023 0.1     Absolute NRBCs 09/24/2023 0.0     SARS CoV2 PCR 09/25/2023 Negative     Sodium 09/25/2023 136     Potassium 09/25/2023 3.4     Chloride 09/25/2023 99     Carbon Dioxide (CO2) 09/25/2023 25     Anion Gap 09/25/2023 12     Urea Nitrogen 09/25/2023 18.3     Creatinine 09/25/2023 0.95     Calcium 09/25/2023 8.9     Glucose 09/25/2023 120 (H)     Alkaline Phosphatase 09/25/2023 316 (H)     AST 09/25/2023 182 (H)     ALT 09/25/2023 146 (H)     Protein Total 09/25/2023 7.0     Albumin 09/25/2023 3.9     Bilirubin Total 09/25/2023 2.7 (H)     GFR Estimate 09/25/2023 87     WBC Count  09/25/2023 14.1 (H)     RBC Count 09/25/2023 4.94     Hemoglobin 09/25/2023 14.0     Hematocrit 09/25/2023 41.9     MCV 09/25/2023 85     MCH 09/25/2023 28.3     MCHC 09/25/2023 33.4     RDW 09/25/2023 13.8     Platelet Count 09/25/2023 235     % Neutrophils 09/25/2023 85     % Lymphocytes 09/25/2023 5     % Monocytes 09/25/2023 10     % Eosinophils 09/25/2023 0     % Basophils 09/25/2023 0     % Immature Granulocytes 09/25/2023 0     NRBCs per 100 WBC 09/25/2023 0     Absolute Neutrophils 09/25/2023 11.9 (H)     Absolute Lymphocytes 09/25/2023 0.7 (L)     Absolute Monocytes 09/25/2023 1.4 (H)     Absolute Eosinophils 09/25/2023 0.0     Absolute Basophils 09/25/2023 0.0     Absolute Immature Granul* 09/25/2023 0.0     Absolute NRBCs 09/25/2023 0.0     Troponin T, High Sensiti* 09/25/2023 14     Magnesium 09/25/2023 1.7     GLUCOSE BY METER POCT 09/25/2023 116 (H)     WBC Count 09/26/2023 16.8 (H)     RBC Count 09/26/2023 4.97     Hemoglobin 09/26/2023 13.8     Hematocrit 09/26/2023 42.8     MCV 09/26/2023 86     MCH 09/26/2023 27.8     MCHC 09/26/2023 32.2     RDW 09/26/2023 14.0     Platelet Count 09/26/2023 228     Magnesium 09/26/2023 2.3     Sodium 09/26/2023 136     Potassium 09/26/2023 3.6     Carbon Dioxide (CO2) 09/26/2023 26     Anion Gap 09/26/2023 10     Urea Nitrogen 09/26/2023 16.5     Creatinine 09/26/2023 0.86     GFR Estimate 09/26/2023 >90     Calcium 09/26/2023 8.6 (L)     Chloride 09/26/2023 100     Glucose 09/26/2023 148 (H)     Alkaline Phosphatase 09/26/2023 255 (H)     AST 09/26/2023 89 (H)     ALT 09/26/2023 119 (H)     Protein Total 09/26/2023 6.8     Albumin 09/26/2023 3.6     Bilirubin Total 09/26/2023 3.8 (H)     Bilirubin Direct 09/26/2023 3.05 (H)     ERCP 09/26/2023                      Value:[facility]  [address] [place] [address]  _______________________________________________________________________________  Patient Name: Pedro Chun         Procedure Date: 9/26/2023 12:25 PM  MRN: [id number]                       Account Number: [id number]  YOB: 1955             Admit Type: Inpatient  Age: 67                               Room: [place] [address]  Note Status: Finalized                Attending MD: MÓNICA THOMPSON MD,     Instrument Name: ERCP Scope 9184        _______________________________________________________________________________     Procedure:             ERCP/EGD with pyloric dilation  Indications:           Abnormal intraoperative cholangiogram, Elevated liver                          enzymes  Providers:             MÓNICA THOMPSON MD  Referring MD:            Medicines:             General Anesthesia  Complications:         No immediate complications.  ____________________________________________                          ___________________________________  Procedure:             Pre-Anesthesia Assessment:                         - Prior to the procedure, a History and Physical was                          performed, and patient medications and allergies were                          reviewed. The patient is competent. The risks and                          benefits of the procedure and the sedation options and                          risks were discussed with the patient. All questions                          were answered and informed consent was obtained.                          Patient identification and proposed procedure were                          verified by the physician and the nurse in the                          pre-procedure area. Mental Status Examination: alert                          and oriented. Airway Examination: normal oropharyngeal                          airway and neck mobility. Respiratory Examination:                          clear to auscultation. CV Examination: norm                          al. ASA                          Grade Assessment: II - A patient with mild systemic                           disease. After reviewing the risks and benefits, the                          patient was deemed in satisfactory condition to                          undergo the procedure. The anesthesia plan was to use                          general anesthesia. Immediately prior to                          administration of medications, the patient was                          re-assessed for adequacy to receive sedatives. The                          heart rate, respiratory rate, oxygen saturations,                          blood pressure, adequacy of pulmonary ventilation, and                          response to care were monitored throughout the                          procedure. The physical status of the patient was                          re-assessed after the procedure.                         After obtaining informed consent, the scope was passed                          under d                          irect vision. Throughout the procedure, the                          patient's blood pressure, pulse, and oxygen                          saturations were monitored continuously. The ERCP                          scope was introduced through the mouth, and used to                          inject contrast into and used to cannulate the bile                          duct. The ERCP was accomplished without difficulty.                          The patient tolerated the procedure well.                                                                                   Findings:       A  film of the abdomen was obtained. Surgical clips, consistent        with a previous cholecystectomy, were seen in the area of the right        upper quadrant of the abdomen. The esophagus was successfully intubated        under direct vision. The scope was advanced to a normal major papilla in        the descending duodenum without detailed examination of the pharynx,        larynx and associated structur                           es, and upper GI tract. The upper GI tract        was significant for mutiple gastric polyps and pyloric stenosis        precluding passage of scope until after dilation with 15 mm pyloric        balloon. A 0.035 inch straight standard wire was passed into the biliary        tree. The Fusion OMNI sphincterotome was passed over the guidewire and        the bile duct was then deeply cannulated. Contrast was injected. I        personally interpreted the bile duct images. There was brisk flow of        contrast through the ducts. Image quality was excellent. Contrast        extended to the hepatic ducts. The common bile duct was moderately        dilated. The largest diameter was 13 mm. An 8 mm biliary sphincterotomy        was made with a Fusion OMNI sphincterotome. There was self limited        oozing from the sphincterotomy which did not require treatment. To        discover objects, the biliary tree was swept with a 15 mm balloon        starting at the bifurcation. Sludge was swept fro                          m the duct. One stone        was removed. No stones remained.                                                                                   Moderate Sedation:       An independent trained observer was present and continuously monitored        the patient.  Impression:            - The common bile duct was moderately dilated.                         - Choledocholithiasis was found. Complete removal was                          accomplished by biliary sphincterotomy and balloon                          extraction.                         - A biliary sphincterotomy was performed.                         - The biliary tree was swept.                         - Gastric stenosis was found at the pylorus. Dilated.                         - Multiple gastric polyps. Resection not attempted.  Recommendation:        - [facility]                         - Avoid aspirin  and nonsteroidal anti-inflammatory                          medicines for 10 days.                                                   - Observe patient's clinical course following today's                          ERCP with therapeutic intervention.                         - Hold Xarelto for 48-72 hrs                                                                                     Mónica Nugent MD  __________________________  MÓNICA NUGENT MD  9/26/2023 1:35:47 PM  I was physically present for the entire viewing portion of the exam.  __________________________  Signature of teaching physician  SUKHIc/Vinod NUGENT MD  Number of Addenda: 0    Note Initiated On: 9/26/2023 12:25 PM  Scope In: 12:58:58 PM  Scope Out: 1:22:06 PM      Lipase 09/27/2023 53     WBC Count 09/27/2023 14.1 (H)     RBC Count 09/27/2023 4.31 (L)     Hemoglobin 09/27/2023 12.2 (L)     Hematocrit 09/27/2023 36.7 (L)     MCV 09/27/2023 85     MCH 09/27/2023 28.3     MCHC 09/27/2023 33.2     RDW 09/27/2023 14.2     Platelet Count 09/27/2023 227     Sodium 09/27/2023 138     Potassium 09/27/2023 3.5     Carbon Dioxide (CO2) 09/27/2023 25     Anion Gap 09/27/2023 10     Urea Nitrogen 09/27/2023 14.5     Creatinine 09/27/2023 0.76     GFR Estimate 09/27/2023 >90     Calcium 09/27/2023 8.8     Chloride 09/27/2023 103     Glucose 09/27/2023 113 (H)     Alkaline Phosphatase 09/27/2023 184 (H)     AST 09/27/2023 41     ALT 09/27/2023 68     Protein Total 09/27/2023 6.4     Albumin 09/27/2023 3.3 (L)     Bilirubin Total 09/27/2023 1.2     GLUCOSE BY METER POCT 09/27/2023 53 (L)     GLUCOSE BY METER POCT 09/27/2023 112 (H)     GLUCOSE BY METER POCT 09/27/2023 154 (H)         Cecelia Lazo PA-C  [facility]  [address]  [address]  [place] [address]

## 2023-09-27 NOTE — PLAN OF CARE
Problem: Plan of Care - These are the overarching goals to be used throughout the patient stay.    Goal: Absence of Hospital-Acquired Illness or Injury  Intervention: Prevent Skin Injury  Recent Flowsheet Documentation  Taken 9/27/2023 0816 by Oliveira, Natasha, RN  Body Position: position changed independently     Problem: Plan of Care - These are the overarching goals to be used throughout the patient stay.    Goal: Optimal Comfort and Wellbeing  Intervention: Monitor Pain and Promote Comfort  Recent Flowsheet Documentation  Taken 9/27/2023 1201 by Natasha Oliveira RN  Pain Management Interventions: medication (see MAR)  Taken 9/27/2023 0816 by Natasha Oliveira RN  Pain Management Interventions: medication offered but refused     Problem: Pain Acute  Goal: Optimal Pain Control and Function  Intervention: Develop Pain Management Plan  Recent Flowsheet Documentation  Taken 9/27/2023 1201 by Natasha Oliveira RN  Pain Management Interventions: medication (see MAR)  Taken 9/27/2023 0816 by Natasha Oliveira RN  Pain Management Interventions: medication offered but refused  Intervention: Prevent or Manage Pain  Recent Flowsheet Documentation  Taken 9/27/2023 0816 by Natasha Oliveira RN  Medication Review/Management: medications reviewed   Goal Outcome Evaluation:  Patient alert and oriented x4. Moving independently in the room. Vitals stable on room air. Tolerating full liquid diet. Saline locked. 2 large bowel movements today. Dressings intact on abdomen. Pain in abdomen managed using scheduled tylenol.

## 2023-09-27 NOTE — PROGRESS NOTES
"[facility] Progress Note       SUBJECTIVE:  Patient reports only abdominal soreness. No nausea/vomiting. Tolerating clears. + flatus but no BM.    Reviewed ERCP findings with patient.        OBJECTIVE:  /76 (BP Location: Left arm)   Pulse 80   Temp 99.1  F (37.3  C) (Oral)   Resp 18   Ht 1.854 m (6' 1\")   Wt 96.2 kg (212 lb)   SpO2 94%   BMI 27.97 kg/m    Temp (24hrs), Av  F (36.7  C), Min:97.3  F (36.3  C), Max:99.1  F (37.3  C)    Patient Vitals for the past 72 hrs:   Weight   23 96.2 kg (212 lb)       Intake/Output Summary (Last 24 hours) at 2023 1002  Last data filed at 2023 1815  Gross per 24 hour   Intake 1340 ml   Output --   Net 1340 ml        PHYSICAL EXAM  GEN: NAD, male appears stated age sitting up in chair  HRT: no LE edema  RESP: unlabored  ABD: soft, mild diffuse tenderness  SKIN: No rash or jaundice      Additional Data:  I have reviewed the patient's new clinical lab results:     Recent Labs   Lab Test 23  0600 23  0510 23   WBC 14.1* 16.8* 14.1*   HGB 12.2* 13.8 14.0   MCV 85 86 85    228 235     Recent Labs   Lab Test 23  0600 23  0510 23   POTASSIUM 3.5 3.6 3.4   CHLORIDE 103 100 99   CO2 25 26 25   BUN 14.5 16.5 18.3   ANIONGAP 10 10 12     Recent Labs   Lab Test 23  0600 23  0510 23  0233 23   ALBUMIN 3.3* 3.6 3.9 4.1   BILITOTAL 1.2 3.8* 2.7* 1.0   ALT 68 119* 146* 37   AST 41 89* 182* 47*   LIPASE 53  --   --  43         Imaging results:  RUQ ultrasound 23:  FINDINGS:  GALLBLADDER: Sludge and stone material in the gallbladder. Gallbladder wall is thickened and edematous at 7 mm. Sonographic Osman's sign is negative.  BILE DUCTS: Common bile duct is dilated up to 1.2 cm. Distal common bile duct is obscured by overlying bowel gas.  LIVER: Normal parenchyma with smooth contour. No focal mass.  RIGHT KIDNEY: No hydronephrosis.  PANCREAS: Obscured by overlying " bowel gas.  No ascites.                                                                   IMPRESSION:  1.  Cholelithiasis with gallbladder wall thickening. Despite a negative sonographic Osman's sign, findings would be suspicious for cholecystitis in the appropriate setting. Clinical correlation.  2.  Common bile duct is dilated up to 1.2 cm. This would be concerning for potential distal common bile duct stone or obstructing process. MRCP suggested in further evaluation.  3.  Pancreas obscured by overlying bowel gas. Remainder of the exam is unremarkable.     CXR 9/24/23:  IMPRESSION: The heart is normal in size. Left basilar discoid atelectasis is present.     Procedure results:  ERCP 9/26/23 (Go):  Findings:       A  film of the abdomen was obtained. Surgical clips, consistent        with a previous cholecystectomy, were seen in the area of the right        upper quadrant of the abdomen. The esophagus was successfully intubated        under direct vision. The scope was advanced to a normal major papilla in        the descending duodenum without detailed examination of the pharynx,        larynx and associated structures, and upper GI tract. The upper GI tract        was significant for mutiple gastric polyps and pyloric stenosis        precluding passage of scope until after dilation with 15 mm pyloric        balloon. A 0.035 inch straight standard wire was passed into the biliary        tree. The Fusion OMNI sphincterotome was passed over the guidewire and        the bile duct was then deeply cannulated. Contrast was injected. I        personally interpreted the bile duct images. There was brisk flow of        contrast through the ducts. Image quality was excellent. Contrast        extended to the hepatic ducts. The common bile duct was moderately        dilated. The largest diameter was 13 mm. An 8 mm biliary sphincterotomy        was made with a Fusion OMNI sphincterotome. There was self limited         oozing from the sphincterotomy which did not require treatment. To        discover objects, the biliary tree was swept with a 15 mm balloon        starting at the bifurcation. Sludge was swept from the duct. One stone        was removed. No stones remained.     Impression:     - The common bile duct was moderately dilated.                          - Choledocholithiasis was found. Complete removal was                          accomplished by biliary sphincterotomy and balloon                          extraction.                          - A biliary sphincterotomy was performed.                          - The biliary tree was swept.                          - Gastric stenosis was found at the pylorus. Dilated.                          - Multiple gastric polyps. Resection not attempted.   Recommendation:        - [facility].                          - Avoid aspirin and nonsteroidal anti-inflammatory                          medicines for 10 days.                          - Observe patient's clinical course following today's                          ERCP with therapeutic intervention.                          - Hold Xarelto for 48-72 hrs        IMPRESSION:  Choledocholithiasis  Acute cholecystitis  This is a 66 y/o male with PMH HTN, HLD, prior PE, lupus, hearing loss, asthma admitted 9/24 for acute cholecystitis after presenting with RUQ pain. US showed cholelithiasis with gallbladder wall thickening and CBD dilation to 1.2 cm. Liver tests elevated concerning for biliary obstruction. He underwent lap mayra 9/25 with positive IOC. He had ERCP with sphincterotomy and stone removal 9/26. No concern for post-ERCP pancreatitis, tolerating clears. He has been on abx but no evidence of cholangitis during ERCP, WBC improving.       PLAN:  - Full liquid diet, ADAT  - Hold Xarelto for 48-72 hours after ERCP  - Okay to stop abx  - Okay to discharge today per GI    We will no longer actively  follow this patient in-house. Please call if questions arise or patient's status changes.       (Dr. ANJUM Irving)  Yesenia Wills PA-C  [facility]  9/27/2023 10:02 AM  [phone] (office)    30 minutes of total time was spent providing patient care, including patient evaluation, reviewing documentation/test results, , and documentation.  ________________________________________________________________________

## 2023-09-27 NOTE — PLAN OF CARE
Problem: Plan of Care - These are the overarching goals to be used throughout the patient stay.    Goal: Plan of Care Review  Description: The Plan of Care Review/Shift note should be completed every shift.  The Outcome Evaluation is a brief statement about your assessment that the patient is improving, declining, or no change.  This information will be displayed automatically on your shift note.  Outcome: Progressing     Problem: Pain Acute  Goal: Optimal Pain Control and Function  Outcome: Progressing  Intervention: Develop Pain Management Plan  Recent Flowsheet Documentation  Taken 9/27/2023 0100 by Katelynn Slater RN  Pain Management Interventions: medication offered but refused     Problem: Gas Exchange Impaired  Goal: Optimal Gas Exchange  Outcome: Progressing   Goal Outcome Evaluation:       Pt alert & oriented. Able to make needs known. Independent in room. IVF Dextrose 5% and 0.45% NaCl + Kcl 20mEq/L infusing at 50ml/hr. Pain managed by scheduled tylenol. IV abx zosyn given as scheduled. On room air, VSS. Will continue to monitor.

## 2023-09-27 NOTE — DISCHARGE SUMMARY
"[facility]  Hospitalist Discharge Summary      Date of Admission:  9/24/2023  Date of Discharge:  9/27/2023  Discharging Provider: Delaney Park MD  Discharge Service: Hospitalist Service    Discharge Diagnoses     Cholecystitis  Choledocholithiasis  Hypertension.  Well-controlled while here.  Decrease chlorthalidone to half dosing due to relative good control off of chlorthalidone while here  Lupus anticoagulant, previous PE  Mild persistent asthma, no exacerbation  Hypokalemia, replaced  Hypomagnesemia, replaced    Clinically Significant Risk Factors     # Overweight: Estimated body mass index is 27.97 kg/m  as calculated from the following:    Height as of this encounter: 1.854 m (6' 1\").    Weight as of this encounter: 96.2 kg (212 lb).       Follow-ups Needed After Discharge   Follow-up Appointments     Follow-up and recommended labs and tests       Follow up with primary care provider, Phoenix Bunch, within 14 days to   evaluate medication change.    Follow-up with general surgery as recommended  Hold your Xarelto until Friday morning            Unresulted Labs Ordered in the Past 30 Days of this Admission       Date and Time Order Name Status Description    9/25/2023  4:10 PM Surgical Pathology Exam In process             Discharge Disposition   Discharged to home  Condition at discharge: Stable    Hospital Course   67year-old male with medical history significant for lupus anticoagulant positive status, hx of prior PE(4 years ago), hypertension, hyperlipidemia, hearing loss, comes in with epigastric and right upper quadrant pain concerning for acute cholecystitis.  Patient was treated with lap mayra with positive IOC, underwent ERCP due to choledocholithiasis.  LFTs continue to improve and were normalized by day of discharge.  Patient was on antibiotics while here but not felt to have active infection and therefore antibiotics discontinued.  Initially with history of nausea and " vomiting antihypertensives were held and slowly resumed but had relative normotension without chlorthalidone so we will decrease to half dosing.  Discharged home in good condition and will hold Xarelto due to ERCP until Friday.               Consultations This Hospital Stay   GASTROENTEROLOGY IP CONSULT  SURGERY GENERAL IP CONSULT  GASTROENTEROLOGY IP CONSULT  SURGERY GENERAL IP CONSULT    Code Status   Full Code    Time Spent on this Encounter   IDelaney MD, personally saw the patient today and spent greater than 30 minutes discharging this patient.       Delaney Park MD  [place] [id number]  Phone: [phone]  Fax: [phone]  ______________________________________________________________________    Physical Exam   Vital Signs: Temp: 99.1  F (37.3  C) Temp src: Oral BP: 133/76 Pulse: 80   Resp: 18 SpO2: 94 % O2 Device: None (Room air)    Weight: 212 lbs 0 oz  General Appearance: Pleasant male in no apparent distress  Respiratory: Clear to auscultation bilaterally  Cardiovascular: Regular rate and rhythm  GI: Soft and nontender without hepatosplenomegaly  Skin: No significant lower extremity edema  Other: Neurologically grossly intact without focal deficits appreciated       Primary Care Physician   Phoenix Bunch    Discharge Orders      Reason for your hospital stay    Cholecystitis with choledocholithiasis     Follow-up and recommended labs and tests     Follow up with primary care provider, Phoenix Bunch, within 14 days to evaluate medication change.    Follow-up with general surgery as recommended  Hold your Xarelto until Friday morning     Activity    Your activity upon discharge: Per general surgery recommendations after cholecystectomy     Diet    Follow this diet upon discharge: Orders Placed This Encounter      Advance Diet as Tolerated: Full Liquid Diet       Significant Results and Procedures   Most Recent 3 CBC's:  Recent Labs    Lab Test 09/27/23  0600 09/26/23  0510 09/25/23  0233   WBC 14.1* 16.8* 14.1*   HGB 12.2* 13.8 14.0   MCV 85 86 85    228 235     Most Recent 3 BMP's:  Recent Labs   Lab Test 09/27/23  0740 09/27/23  0640 09/27/23  0632 09/27/23  0600 09/26/23  0510 09/25/23  1348 09/25/23  0233   NA  --   --   --  138 136  --  136   POTASSIUM  --   --   --  3.5 3.6  --  3.4   CHLORIDE  --   --   --  103 100  --  99   CO2  --   --   --  25 26  --  25   BUN  --   --   --  14.5 16.5  --  18.3   CR  --   --   --  0.76 0.86  --  0.95   ANIONGAP  --   --   --  10 10  --  12   GABRIELLA  --   --   --  8.8 8.6*  --  8.9   * 112* 53* 113* 148*   < > 120*    < > = values in this interval not displayed.     Most Recent 2 LFT's:  Recent Labs   Lab Test 09/27/23  0600 09/26/23  0510   AST 41 89*   ALT 68 119*   ALKPHOS 184* 255*   BILITOTAL 1.2 3.8*     7-Day Micro Results       Collected Updated Procedure Result Status      09/25/2023 0118 09/25/2023 0154 Asymptomatic COVID-19 Virus (Coronavirus) by PCR Nasopharyngeal [[id number]]    Swab from Nasopharyngeal    Final result Component Value   SARS CoV2 PCR Negative   NEGATIVE: SARS-CoV-2 (COVID-19) RNA not detected, presumed negative.                ,   Results for orders placed or performed during the hospital encounter of 09/24/23   Abdomen US, limited (RUQ only)    Narrative    EXAM: US ABDOMEN LIMITED  LOCATION: [facility]  DATE: 9/24/2023    INDICATION: RUQ pain.  COMPARISON: None.  TECHNIQUE: Limited abdominal ultrasound.    FINDINGS:    GALLBLADDER: Sludge and stone material in the gallbladder. Gallbladder wall is thickened and edematous at 7 mm. Sonographic Osman's sign is negative.    BILE DUCTS: Common bile duct is dilated up to 1.2 cm. Distal common bile duct is obscured by overlying bowel gas.    LIVER: Normal parenchyma with smooth contour. No focal mass.    RIGHT KIDNEY: No hydronephrosis.    PANCREAS: Obscured by overlying bowel gas.    No  ascites.      Impression    IMPRESSION:  1.  Cholelithiasis with gallbladder wall thickening. Despite a negative sonographic Osman's sign, findings would be suspicious for cholecystitis in the appropriate setting. Clinical correlation.    2.  Common bile duct is dilated up to 1.2 cm. This would be concerning for potential distal common bile duct stone or obstructing process. MRCP suggested in further evaluation.    3.  Pancreas obscured by overlying bowel gas. Remainder of the exam is unremarkable.       XR Chest Port 1 View    Narrative    EXAM: XR CHEST PORT 1 VIEW  LOCATION: [facility]  DATE: 9/24/2023    INDICATION: pain  COMPARISON: None.      Impression    IMPRESSION: The heart is normal in size. Left basilar discoid atelectasis is present.   XR Surgery GAVIN  Fluoro G/T 5 Min    Narrative    This exam was marked as non-reportable because it will not be read by a   radiologist or a [place] non-radiologist provider.         XR Surgery GAVIN  Fluoro G/T 5 Min    Narrative    This exam was marked as non-reportable because it will not be read by a   radiologist or a [place] non-radiologist provider.             Discharge Medications   Current Discharge Medication List        CONTINUE these medications which have CHANGED    Details   chlorthalidone (HYGROTON) 25 MG tablet Take 0.5 tablets (12.5 mg) by mouth every morning    Associated Diagnoses: Benign essential hypertension      XARELTO ANTICOAGULANT 20 MG TABS tablet Take 1 tablet (20 mg) by mouth every morning    Associated Diagnoses: Pulmonary embolism on left (H)           CONTINUE these medications which have NOT CHANGED    Details   acetaminophen (TYLENOL) 500 MG tablet [ACETAMINOPHEN (TYLENOL) 500 MG TABLET] Take 1-2 tablets (500-1,000 mg total) by mouth every 6 (six) hours as needed for pain.  Refills: 0    Associated Diagnoses: Other acute pulmonary embolism without acute cor pulmonale (H)      albuterol (PROAIR HFA/PROVENTIL  HFA/VENTOLIN HFA) 108 (90 Base) MCG/ACT inhaler Inhale 2 puffs into the lungs every 6 hours as needed for shortness of breath      FLOVENT DISKUS 100 MCG/ACT inhaler Inhale 1 puff into the lungs 2 times daily      lansoprazole (PREVACID) 30 MG capsule Take 30 mg by mouth every morning      losartan (COZAAR) 100 MG tablet Take 100 mg by mouth every morning      methylcellulose (CITRUCEL) 500 mg Tab [METHYLCELLULOSE (CITRUCEL) 500 MG TAB] Take 1,000 mg by mouth every morning.      multivitamin therapeutic tablet Take 1 tablet by mouth every morning           Allergies   Allergies   Allergen Reactions    Betadine [Povidone Iodine] Rash

## 2023-09-28 LAB
PATH REPORT.COMMENTS IMP SPEC: NORMAL
PATH REPORT.COMMENTS IMP SPEC: NORMAL
PATH REPORT.FINAL DX SPEC: NORMAL
PATH REPORT.GROSS SPEC: NORMAL
PATH REPORT.MICROSCOPIC SPEC OTHER STN: NORMAL
PATH REPORT.RELEVANT HX SPEC: NORMAL
PHOTO IMAGE: NORMAL

## 2023-09-28 PROCEDURE — 88304 TISSUE EXAM BY PATHOLOGIST: CPT | Mod: 26 | Performed by: PATHOLOGY

## 2023-09-29 ENCOUNTER — PATIENT OUTREACH (OUTPATIENT)
Dept: CARE COORDINATION | Facility: CLINIC | Age: 68
End: 2023-09-29
Payer: COMMERCIAL

## 2023-09-29 NOTE — PROGRESS NOTES
"Clinic Care Coordination Contact  [facility]: Post-Discharge Note  SITUATION                                                      Admission:    Admission Date: 09/24/23   Reason for Admission: Acute cholecystitis  Discharge:   Discharge Date: 09/27/23  Discharge Diagnosis: Acute cholecystitis    BACKGROUND                                                      Per hospital discharge summary and inpatient provider notes:    67year-old male with medical history significant for lupus anticoagulant positive status, hx of prior PE(4 years ago), hypertension, hyperlipidemia, hearing loss, comes in with epigastric and right upper quadrant pain concerning for acute cholecystitis.  Patient was treated with lap mayra with positive IOC, underwent ERCP due to choledocholithiasis.  LFTs continue to improve and were normalized by day of discharge.  Patient was on antibiotics while here but not felt to have active infection and therefore antibiotics discontinued.  Initially with history of nausea and vomiting antihypertensives were held and slowly resumed but had relative normotension without chlorthalidone so we will decrease to half dosing.  Discharged home in good condition and will hold Xarelto due to ERCP until Friday        Discharge Assessment  How are you doing now that you are home?: \"Doing good thank you\"  How are your symptoms? (Red Flag symptoms escalate to triage hotline per guidelines): Improved  Do you feel your condition is stable enough to be safe at home until your provider visit?: Yes  Does the patient have their discharge instructions? : Yes  Does the patient have questions regarding their discharge instructions? : No  Were you started on any new medications or were there changes to any of your previous medications? : Yes  Does the patient have all of their medications?: Yes  Do you have questions regarding any of your medications? : No  Do you have all of your needed medical supplies or equipment (DME)?  " (i.e. oxygen tank, CPAP, cane, etc.): Yes  Discharge follow-up appointment scheduled within 14 calendar days? : No  Is patient agreeable to assistance with scheduling? : No    Post-op ([facility] Only)  If the patient had a surgery or procedure, do they have any questions for a nurse?: No             PLAN                                                      Outpatient Plan:    Follow up with primary care provider, Phoenix Bunch, within 14 days to evaluate medication change.    Follow-up with general surgery as recommended  Hold your Xarelto until Friday morning          Activity     Your activity upon discharge: Per general surgery recommendations after cholecystectomy          Diet     Follow this diet upon discharge: Orders Placed This Encounter      Advance Diet as Tolerated: Full Liquid Diet       No future appointments.      For any urgent concerns, please contact our 24 hour nurse triage line: [phone] ([phone])         Dasha Ortega MA

## 2023-10-01 LAB
ATRIAL RATE - MUSE: 82 BPM
DIASTOLIC BLOOD PRESSURE - MUSE: 99 MMHG
INTERPRETATION ECG - MUSE: NORMAL
P AXIS - MUSE: 64 DEGREES
PR INTERVAL - MUSE: 184 MS
QRS DURATION - MUSE: 112 MS
QT - MUSE: 386 MS
QTC - MUSE: 450 MS
R AXIS - MUSE: 75 DEGREES
SYSTOLIC BLOOD PRESSURE - MUSE: 162 MMHG
T AXIS - MUSE: 70 DEGREES
VENTRICULAR RATE- MUSE: 82 BPM

## 2024-12-07 ENCOUNTER — HEALTH MAINTENANCE LETTER (OUTPATIENT)
Age: 69
End: 2024-12-07

## (undated) DEVICE — SOL RINGERS LACTATED 1000ML BAG 2B2324X

## (undated) DEVICE — SU MONOCRYL+ 4-0 18IN PS2 UND MCP496G

## (undated) DEVICE — SYSTEM LAPAROVUE VISIBILITY LAPVUE10

## (undated) DEVICE — CLIP APPLIER ENDO ROTATING 10MM MED/LG ER320

## (undated) DEVICE — DECANTER VIAL 2006S

## (undated) DEVICE — BALLOON EXTRACTION 15X1950MM 3.2MM TL B-V243Q-A

## (undated) DEVICE — ENDO SHEARS RENEW LAP ENDOCUT SCISSOR TIP 16.5MM 3142

## (undated) DEVICE — ENDO TROCAR FIRST ENTRY KII FIOS Z-THRD 05X100MM CTF03

## (undated) DEVICE — PREP CHLORAPREP 26ML TINTED HI-LITE ORANGE 930815

## (undated) DEVICE — GLOVE BIOGEL PI ULTRATOUCH G SZ 8.0 42180

## (undated) DEVICE — PLATE GROUNDING ADULT W/CORD 9165L

## (undated) DEVICE — BASIN EMESIS STERILE  SSK9005A

## (undated) DEVICE — DRSG TEGADERM 2 3/8X2 3/4" 1624W

## (undated) DEVICE — CUSTOM PACK LAP CHOLE SBA5BLCHEA

## (undated) DEVICE — TUBING SUCTION MEDI-VAC 1/4"X20' N620A - HE

## (undated) DEVICE — SUCTION MANIFOLD NEPTUNE 2 SYS 1 PORT 702-025-000

## (undated) DEVICE — WIRE GUIDE 0.35X270CM STRAIGHT TIP VISIGLIDE G-260-3527S

## (undated) DEVICE — BLADE KNIFE SURG 11 371111

## (undated) DEVICE — INFLATION DEVICE BIG 60 ENDO-AN6012

## (undated) DEVICE — DRESSING TEGADERM IV 3 1/2 X 4 1/4 1635

## (undated) DEVICE — TUBING LAP SUCT/IRRIG STRYKER 250070500

## (undated) DEVICE — DRSG TELFA 3X4" 1050

## (undated) DEVICE — ENDO TROCAR SLEEVE KII Z-THREADED 05X100MM CTS02

## (undated) DEVICE — SU VICRYL+ 0 27 UR6 VLT VCP603H

## (undated) DEVICE — SOL WATER IRRIG 1000ML BOTTLE 2F7114

## (undated) DEVICE — TUBING SMOKE EVAC PNEUMOCLEAR HIGH FLOW 0620050250

## (undated) DEVICE — ENDO FUSION OMNI-TOME G31903

## (undated) DEVICE — GOWN XXL 9575

## (undated) DEVICE — Device

## (undated) DEVICE — ENDO TROCAR FIRST ENTRY KII FIOS Z-THRD 11X100MM CTF33

## (undated) DEVICE — CATH BALLOON PYLORIC 15MM 000848

## (undated) RX ORDER — GLYCOPYRROLATE 0.2 MG/ML
INJECTION, SOLUTION INTRAMUSCULAR; INTRAVENOUS
Status: DISPENSED
Start: 2023-09-25

## (undated) RX ORDER — ONDANSETRON 2 MG/ML
INJECTION INTRAMUSCULAR; INTRAVENOUS
Status: DISPENSED
Start: 2023-09-25

## (undated) RX ORDER — PROPOFOL 10 MG/ML
INJECTION, EMULSION INTRAVENOUS
Status: DISPENSED
Start: 2023-09-26

## (undated) RX ORDER — DEXAMETHASONE SODIUM PHOSPHATE 10 MG/ML
INJECTION, SOLUTION INTRAMUSCULAR; INTRAVENOUS
Status: DISPENSED
Start: 2023-09-25

## (undated) RX ORDER — FENTANYL CITRATE 50 UG/ML
INJECTION, SOLUTION INTRAMUSCULAR; INTRAVENOUS
Status: DISPENSED
Start: 2023-09-26

## (undated) RX ORDER — FENTANYL CITRATE 50 UG/ML
INJECTION, SOLUTION INTRAMUSCULAR; INTRAVENOUS
Status: DISPENSED
Start: 2023-09-25

## (undated) RX ORDER — DEXAMETHASONE SODIUM PHOSPHATE 10 MG/ML
INJECTION, SOLUTION INTRAMUSCULAR; INTRAVENOUS
Status: DISPENSED
Start: 2023-09-26

## (undated) RX ORDER — BUPIVACAINE HYDROCHLORIDE 2.5 MG/ML
INJECTION, SOLUTION EPIDURAL; INFILTRATION; INTRACAUDAL
Status: DISPENSED
Start: 2023-09-25

## (undated) RX ORDER — ONDANSETRON 2 MG/ML
INJECTION INTRAMUSCULAR; INTRAVENOUS
Status: DISPENSED
Start: 2023-09-26

## (undated) RX ORDER — LIDOCAINE HYDROCHLORIDE 10 MG/ML
INJECTION, SOLUTION EPIDURAL; INFILTRATION; INTRACAUDAL; PERINEURAL
Status: DISPENSED
Start: 2023-09-26